# Patient Record
Sex: MALE | Race: WHITE | NOT HISPANIC OR LATINO | Employment: OTHER | URBAN - METROPOLITAN AREA
[De-identification: names, ages, dates, MRNs, and addresses within clinical notes are randomized per-mention and may not be internally consistent; named-entity substitution may affect disease eponyms.]

---

## 2018-08-30 ENCOUNTER — TELEPHONE (OUTPATIENT)
Dept: GASTROENTEROLOGY | Facility: CLINIC | Age: 78
End: 2018-08-30

## 2018-08-30 NOTE — TELEPHONE ENCOUNTER
[facility]       Referred to: [facility]     Provider Requested: N/A     Referral Received:  8/30/2018     Records received: 8/30/2018: No Care Everywhere access     Images received: 8/30/2018: film called to grab images.     Evaluation for: chronic pancreatitis/lesion     Clinical History (per RN review):     MD review date:   MD Decision for clinic consultation/Orders:            Referral updates/Patient contacted:

## 2018-08-30 NOTE — TELEPHONE ENCOUNTER
[facility] Call Center    Phone Message    May a detailed message be left on voicemail: Unknown     Reason for Call: Other: [facility] Intake Form    Referring/Requesting provider and Health care System:    [facility] - Aden Castellanos     [facility] contact - N/A     Requested provider:  N/A     Has patient been evaluated in clinic or had a procedure [facility] provider in the last 5 years: No      Indication/Diagnosis for consultation:  Chronic Pancreatitis / Lesion     Is diagnosis on list of approved diagnosis:     Yes     Has patient been evaluated by another Gastroenterologist?    [facility] GI Provider       CT scan     Yes: "[facility] [facility], 8/6/2018     MRI         No  Upper Endoscopy/EGD  Yes: [facility], 8/14/2018    Endoscopic Ultrasound/EUS       No    ERCP     No    Colonoscopy   No    Are images able/being pushed to our system?    Yes     Is patient aware of request for clinc consultation and ok to be contacted to schedule?      Yes           Action Taken: Message routed to:  [facility]

## 2018-09-04 NOTE — TELEPHONE ENCOUNTER
19 pages of medical records received.   Hard copy folder created and handed over to RNCC.     09/04/2018 @ 849 A

## 2018-09-06 ENCOUNTER — CARE COORDINATION (OUTPATIENT)
Dept: GASTROENTEROLOGY | Facility: CLINIC | Age: 78
End: 2018-09-06

## 2018-09-06 DIAGNOSIS — K86.9 PANCREATIC LESION: Primary | ICD-10-CM

## 2018-09-06 NOTE — PROGRESS NOTES
"[facility] Referral       Referring provider:  Dr. Aden Valente     Clinic Contact: Not provided.     Referred to: [facility]     Referral Received: 8/30/2018    Records received: Care everywhere access today.     Images received: yes    Routed for MD review date: 09/06/18      Evaluation for: Pancreas lesion vs chronic pancreatitis.       Clinical History (per RN review of records provided):     - Patient was evaluated for abdominal pain x5 days. Decreased appetite. Reports his stools are \"never really normal.\"     - abdominal pain is worse with eating.     - Patient underwent CT scan and endoscopy for further evaluation.     - Full endoscopy report and CT report available in care everywhere.     - Per reports - recommend possible evaluation with pancreas specialist.       MD Decision for clinic consultation/Orders:     09/07/2018 12:36 PM - Message received regarding orders for clinic appointment.     Orders placed for MRI and routed to scheduling to arrange for MRI and visit.     Will plan for EUS pending results of MRI and visit.     Referral updates/Patient contacted:     09/07/2018 12:37 PM - message sent to scheduling to arrange for visit with Dr. Boyd with MRI prior.             "

## 2018-09-10 ENCOUNTER — CARE COORDINATION (OUTPATIENT)
Dept: GASTROENTEROLOGY | Facility: CLINIC | Age: 78
End: 2018-09-10

## 2018-09-10 ENCOUNTER — TELEPHONE (OUTPATIENT)
Dept: GASTROENTEROLOGY | Facility: CLINIC | Age: 78
End: 2018-09-10

## 2018-09-10 NOTE — TELEPHONE ENCOUNTER
Spoke with patients wife regarding plan for MRI/MRCP followed by appointment. Message already sent to scheduling to reach out to patient/wife to schedule.

## 2018-09-10 NOTE — TELEPHONE ENCOUNTER
Health Call Center    Phone Message    May a detailed message be left on voicemail: yes    Reason for Call: Other: Patient's wife Apryl, called to set up an appointment for a MRCP and consult with Dr. Boyd for patient. She can be reached at [phone] at any time.     Action Taken: Message routed to:  [facility]

## 2018-10-10 ENCOUNTER — RADIANT APPOINTMENT (OUTPATIENT)
Dept: MRI IMAGING | Facility: CLINIC | Age: 78
End: 2018-10-10
Attending: INTERNAL MEDICINE
Payer: COMMERCIAL

## 2018-10-10 ENCOUNTER — OFFICE VISIT (OUTPATIENT)
Dept: GASTROENTEROLOGY | Facility: CLINIC | Age: 78
End: 2018-10-10
Payer: COMMERCIAL

## 2018-10-10 ENCOUNTER — APPOINTMENT (OUTPATIENT)
Dept: LAB | Facility: CLINIC | Age: 78
End: 2018-10-10
Payer: COMMERCIAL

## 2018-10-10 ENCOUNTER — RADIANT APPOINTMENT (OUTPATIENT)
Dept: GENERAL RADIOLOGY | Facility: CLINIC | Age: 78
End: 2018-10-10
Attending: INTERNAL MEDICINE
Payer: COMMERCIAL

## 2018-10-10 VITALS
HEIGHT: 72 IN | TEMPERATURE: 97.4 F | HEART RATE: 69 BPM | BODY MASS INDEX: 29.54 KG/M2 | OXYGEN SATURATION: 96 % | DIASTOLIC BLOOD PRESSURE: 82 MMHG | SYSTOLIC BLOOD PRESSURE: 147 MMHG | WEIGHT: 218.1 LBS

## 2018-10-10 DIAGNOSIS — K86.9 PANCREATIC LESION: ICD-10-CM

## 2018-10-10 DIAGNOSIS — K85.90 RECURRENT ACUTE PANCREATITIS: Primary | ICD-10-CM

## 2018-10-10 LAB — RADIOLOGIST FLAGS: NORMAL

## 2018-10-10 RX ORDER — IPRATROPIUM BROMIDE 21 UG/1
2 SPRAY, METERED NASAL EVERY 12 HOURS
COMMUNITY

## 2018-10-10 RX ORDER — WARFARIN SODIUM 5 MG/1
5 TABLET ORAL DAILY
COMMUNITY
Start: 2018-10-08

## 2018-10-10 RX ORDER — GADOBUTROL 604.72 MG/ML
10 INJECTION INTRAVENOUS ONCE
Status: COMPLETED | OUTPATIENT
Start: 2018-10-10 | End: 2018-10-10

## 2018-10-10 RX ORDER — SOTALOL HYDROCHLORIDE 120 MG/1
120 TABLET ORAL 2 TIMES DAILY
COMMUNITY
Start: 2017-06-28

## 2018-10-10 RX ADMIN — GADOBUTROL 10 ML: 604.72 INJECTION INTRAVENOUS at 13:39

## 2018-10-10 ASSESSMENT — PAIN SCALES - GENERAL: PAINLEVEL: NO PAIN (0)

## 2018-10-10 NOTE — MR AVS SNAPSHOT
After Visit Summary   10/10/2018    Fransico Scales    MRN: [id number]           Patient Information     Date Of Birth          1940        Visit Information        Provider Department      10/10/2018 4:00 PM Dontae Boyd MD [facility] Pancreas and Biliary        Today's Diagnoses     Recurrent acute pancreatitis    -  1      Care Instructions    I've included a brief summary of your discussion and care plan from today's visit below.  Please review this information with your primary care provider.  _______________________________________________________________________    As discussed by Dr. Boyd, we will proceed with the following:     - Plan for Endoscopic ultrasound (EUS). Our  will contact you to confirm the plans for the procedure.      - Labs today.      Return to GI Clinic pending the results.     As we spoke, once we get results of the tests, we will be discussing them and making the necessary changes.   _______________________________________________________________________    It was a pleasure seeing you in clinic today - please be in touch if there are any further questions that arise following today's visit.  During business hours, you may reach my Clinic Coordinator at [phone].  For urgent/emergent questions after business hours, you may reach the on-call GI Fellow by contacting the [facility]  at [phone].    Any benign/non-urgent test results are usually communicated via letter or [employer] message within 1-2 weeks after completion.  Urgent results (those that require a change in the previously-discussed care plan) are usually communicated via a phone call once available from our clinic staff to discuss the results and the next steps in your evaluation.    I recommend signing up for medidametrics access if you have not already done so and are comfortable with using a computer.  This allows for online access to your lab results and also helps you  communicate efficiently with my clinic should any questions arise in your care.      Sincerely,    Sophia Armstrong RN   Care Coordinator, Dr. Boyd  P:[phone]  F:[phone]              Follow-ups after your visit        Who to contact     Please call your clinic at [phone] to:    Ask questions about your health    Make or cancel appointments    Discuss your medicines    Learn about your test results    Speak to your doctor            Additional Information About Your Visit        Nu[employer]ob[employer] Information     Doodle is an electronic gateway that provides easy, online access to your medical records. With Doodle, you can request a clinic appointment, read your test results, renew a prescription or communicate with your care team.     To sign up for Doodle visit the website at [email or web address]   You will be asked to enter the access code listed below, as well as some personal information. Please follow the directions to create your username and password.     Your access code is: [email or web address]  Expires: 12/10/2018  2:03 PM     Your access code will  in 90 days. If you need help or a new code, please contact your [facility] or call [phone] for assistance.        Care EveryWhere ID     This is your Care EveryWhere ID. This could be used by other organizations to access your [facility] medical records  [id number]        Your Vitals Were     Pulse Temperature Height Pulse Oximetry BMI (Body Mass Index)       69 97.4  F (36.3  C) (Oral) 1.829 m (6') 96% 29.58 kg/m2        Blood Pressure from Last 3 Encounters:   10/10/18 147/82   14 110/66   10/03/11 135/70    Weight from Last 3 Encounters:   10/10/18 98.9 kg (218 lb 1.6 oz)   07 96.5 kg (212 lb 12.8 oz)   07 95.7 kg (211 lb)              We Performed the Following     IGG Subclasses 1-4 ([facility])        Primary Care Provider Office Phone # Fax #    Kandi Khan [phone]        [address]  [facility] [address]        Equal Access to Services     ISABELHAYLEE PERFECTO : Hadii aad ku hadjoeyobie Sotrentonali, waaxda luqadaha, qaybta kaalmada linuseveliohalley, kilo riverarasheedatony londono. So [facility] [phone].    ATENCIÓN: Si habla español, tiene a cunningham disposición servicios gratuitos de asistencia lingüística. Llame al [phone].    We comply with applicable federal civil rights laws and [place] laws. We do not discriminate on the basis of race, color, national origin, age, disability, sex, sexual orientation, or gender identity.            Thank you!     Thank you for choosing [facility]  for your care. Our goal is always to provide you with excellent care. Hearing back from our patients is one way we can continue to improve our services. Please take a few minutes to complete the written survey that you may receive in the mail after your visit with us. Thank you!             Your Updated Medication List - Protect others around you: Learn how to safely use, store and throw away your medicines at www.disposemymeds.org.          This list is accurate as of 10/10/18  4:45 PM.  Always use your most recent med list.                   Brand Name Dispense Instructions for use Diagnosis    * allopurinol 100 MG tablet    ZYLOPRIM     1 TABLET DAILY        * allopurinol 100 MG tablet    ZYLOPRIM     Take 100 mg by mouth daily.        carbidopa-levodopa  MG per tablet    SINEMET    0    1 tabs at bedtime for restless legs        cetirizine 10 MG tablet    zyrTEC     Take 10 mg by mouth 2 times daily.        DOCUSATE SODIUM PO      Take  by mouth as needed.        FIBERCON 625 MG tablet   Generic drug:  calcium polycarbophil      1 tablet every morning        GLUCOSAMINE CHONDROITIN Tabs      1 tablet twice daily        imiquimod 5 % cream    ALDARA     apply to red scaly patches of the face 3 x weekly        ipratropium 0.03 % spray    ATROVENT      Spray 2 sprays into both nostrils every 12 hours        MELATONIN PO      Take  by mouth daily. Take one-half tablet        metoprolol tartrate 50 MG tablet    LOPRESSOR     1 TABLET TWICE DAILY        MULTIVITAMIN PO      1 daily        omeprazole 20 MG CR capsule    priLOSEC     1 CAPSULE DAILY        simvastatin 40 MG tablet    ZOCOR     Take 40 mg by mouth At Bedtime. Take one-half tablet        sotalol 120 MG tablet    BETAPACE     Take 120 mg by mouth        * terazosin 5 MG capsule    HYTRIN     1 CAPSULE AT BEDTIME    Rotator cuff syndrome       * terazosin 10 MG capsule    HYTRIN     Take 10 mg by mouth At Bedtime.        traMADol 50 MG tablet    ULTRAM     1 TABLET EVERY 4 TO 6 HOURS AS NEEDED        TYLENOL ARTHRITIS PAIN 650 MG CR tablet   Generic drug:  acetaminophen      Take 650 mg by mouth. Take two tablets in the AM and two tablets in the PM.        VANICREAM SPF 60 EX      None Entered        warfarin 5 MG tablet    COUMADIN     Take 5 mg by mouth daily        ZOLMitriptan 2.5 MG tablet    ZOMIG     1 1/2 tab prn for migraine        * Notice:  This list has 4 medication(s) that are the same as other medications prescribed for you. Read the directions carefully, and ask your doctor or other care provider to review them with you.

## 2018-10-10 NOTE — PATIENT INSTRUCTIONS
I've included a brief summary of your discussion and care plan from today's visit below.  Please review this information with your primary care provider.  _______________________________________________________________________    As discussed by Dr. Boyd, we will proceed with the following:     - Plan for Endoscopic ultrasound (EUS). Our  will contact you to confirm the plans for the procedure.      - Labs today.      Return to GI Clinic pending the results.     As we spoke, once we get results of the tests, we will be discussing them and making the necessary changes.   _______________________________________________________________________    It was a pleasure seeing you in clinic today - please be in touch if there are any further questions that arise following today's visit.  During business hours, you may reach my Clinic Coordinator at [phone].  For urgent/emergent questions after business hours, you may reach the on-call GI Fellow by contacting the [facility]  at [phone].    Any benign/non-urgent test results are usually communicated via letter or [employer] message within 1-2 weeks after completion.  Urgent results (those that require a change in the previously-discussed care plan) are usually communicated via a phone call once available from our clinic staff to discuss the results and the next steps in your evaluation.    I recommend signing up for [employer] access if you have not already done so and are comfortable with using a computer.  This allows for online access to your lab results and also helps you communicate efficiently with my clinic should any questions arise in your care.      Sincerely,    Sophia Armstrong RN   Care Coordinator, Dr. Boyd  P:[phone]  F:[phone]

## 2018-10-10 NOTE — LETTER
10/10/2018       RE: Fransico Scales  [address] [facility] [id number]     Dear Colleague,    Thank you for referring your patient, Fransico Scales, to the [facility] PANCREAS AND BILIARY at [facility]. Please see a copy of my visit note below.    GASTROENTEROLOGY OUTPATIENT CLINIC CONSULT  DATE OF SERVICE: 10/10/2018  PHYSICIAN REQUESTING CONSULT: Ambrosio Danielle  Reason for Consultation: abnormal CT; idiopathic recurrent acute pancreatitis    ASSESSMENT:  78 year old male with a PMHx of idiopathic acute pancreatitis x 2 in 2007, s/p cholecystectomy, h/o Schatzki's ring s/p prior dilation and atrial fibrillation on warfarin who was referred after an episodes of epigastric pain and CT showing possible focal pancreatitis in the head. His symptom presentation and CT imaging in early August are both compatible with very mild acute interstitial pancreatitis and meets two out of the 3 criteria which is sufficient. There was a 5 mm cystic lesion in the body of the pancreas on the CT from August but this could represent an acute fluid collection from acute pancreatitis or a cystic neoplasm. We obtained an MRI/MRCP today as part of his work up for recurrent acute pancreatitis and I reviewed the images with the patient and his wife. Multiple cysts are seen throughout the pancreas with the largest cysts measuring 9 mm and 10 mm in the tail and head arising from the PD but without any worrisome features and normal PD size. The bile duct was also normal. 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 Other work up for acute pancreatitis has been negative (no alcohol, s/p ismael, normal Ca/TG, no culprit medications, no family history of pancreatitis). We will proceed with an EUS to rule out underlying obstructing neoplasm although I explained that is  unlikely as well as rule out small CBD stones that MRCP is sensitive enough to detect. Will also check IgG4 level to rule out AIP, but again unlikely. If work up negative, would recommend expectant management. Unclear what his risk would be at that point for a recurrent episode of acute pancreatitis.     RECOMMENDATIONS:  - Schedule for EUS  - Will need to hold warfarin prior to EUS  - IgG4 level      Thank you for this consultation.  It was a pleasure to participate in the care of this patient; please contact us with any further questions.  A total of 40 minutes was spent in face to face evaluation with this patient, >50% of which was counseling and coordinating a management plan for this patient.     Dontae Boyd MD  [facility]  Division of Gastroenterology and Hepatology  [facility] [address]  [address] [address]    __________________________________________________________________________________  HPI:  78 year old male with a PMHx of idiopathic acute pancreatitis x 2, s/p cholecystectomy, h/o Schatzki's ring s/p prior dilation and atrial fibrillation on warfarin who was referred after an episodes of epigastric pain and CT showing possible focal pancreatitis in the head. The patient reported being in his usual state of health without pain when he developed sudden onset epigastric pain radiating to his back that was 4-6/10 in early August 2018. He had anorexia but no nausea or vomiting.  The pain was worse with eating. The pain lasted about 4-5 days and spontaneously resolved. He was evaluated at the time of his symptoms with normal labs including lipase of 77 and LFTs, except for elevated ESR and CRP (28 and 2.72 respectively). He had a CT on 8/6/18 that showed mild fat stranding at the junction of the pancreatic head and duodenum along with a 5 mm cystic lesion at the head/body junction. The CT was otherwise normal. He had an EGD on 8/14 at [facility] that also  included gastric and duodenal biopsies that were essentially normal. Currently, the patient feels well without pain or other symptoms. Patient denies any alcohol or tobacco use. He denies any new medications. No trauma history or recent travel. Calcium and triglycerides were normal.     In regards to his prior history of acute pancreatitis, he believes his first episode happened in 2006 and the pain was much more severe then the episode he had this past August. [facility] and [facility] that did not find a cause for his pancreatitis. He thinks he had another episode of acute pancreatitis for which he was hospitalized for in ~2007 but he and his wife were not sure and we do not have the records to review. They report that after that episode he had his gallbladder removed and he might have had a procedure that sounds like and ERCP but it is not clear.     Patient denies jaundice, abdominal distension, lower extremity edema, lethargy or confusion.    No history of melena, hematemesis or hematochezia.    Patient denies fevers, sweats, chills or weight loss.    PMHx:  Past Medical History:   Diagnosis Date     Acute pancreatitis 2007 January and February     Essential hypertension, benign      GERD (gastroesophageal reflux disease)      Gout      Gout, unspecified      Hypertension      Localized osteoarthrosis not specified whether primary or secondary, lower leg 5/24/07    Hospitalized     Other and unspecified malignant neoplasm of skin of other and unspecified parts of face      Other forms of migraine, with intractable migraine, so stated, without mention of status migrainosus      Restless leg syndrome      Restless legs syndrome (RLS)      Squamous cell carcinoma      PSurgHx:  Cholecystectomy ~2012  Left Knee replacement  Lithotripsy for bladder stones  Bilateral cataract surgery    MEDS:  Current Outpatient Prescriptions   Medication     acetaminophen (TYLENOL  ARTHRITIS PAIN) 650 MG CR tablet     allopurinol (ZYLOPRIM) 100 MG tablet     ALLOPURINOL 100 MG OR TABS     CARBIDOPA-LEVODOPA  MG OR TABS     cetirizine (ZYRTEC) 10 MG tablet     DOCUSATE SODIUM PO     FIBERCON 625 MG OR TABS     GLUCOSAMINE CHONDROITIN OR TABS     IMIQUIMOD 5 % EX CREA     ipratropium (ATROVENT) 0.03 % spray     METOPROLOL TARTRATE 50 MG OR TABS     MULTIVITAMIN OR     Nutritional Supplements (MELATONIN PO)     OMEPRAZOLE 20 MG OR CPDR     simvastatin (ZOCOR) 40 MG tablet     sotalol (BETAPACE) 120 MG tablet     TERAZOSIN HCL 5 MG OR CAPS     TRAMADOL HCL 50 MG OR TABS     VANICREAM SPF 60 EX     warfarin (COUMADIN) 5 MG tablet     terazosin (HYTRIN) 10 MG capsule     ZOLMITRIPTAN 2.5 MG OR TABS     No current facility-administered medications for this visit.      ALLERGIES:    Allergies   Allergen Reactions     Doxycycline Rash     Burning sensation on skin     FHx:  Family History   Problem Relation Age of Onset     Cancer Mother      pancreatic     HEART DISEASE Father       age 55, MI     Diabetes Sister      1/2 sister       SOCIAL Hx:  Social History     Social History     Marital status:      Spouse name: N/A     Number of children: 1     Years of education: 12     Occupational History     retired      /means     Social History Main Topics     Smoking status: Former Smoker     Quit date: 1994     Smokeless tobacco: Never Used     Alcohol use No      Comment: very little     Drug use: Not on file     Sexual activity: Not on file     Other Topics Concern      Service Yes     Blood Transfusions No     Caffeine Concern Yes     alot of COKE     Sleep Concern No     Stress Concern No     Exercise No     knee problems     Seat Belt Yes     Social History Narrative       ROS: A comprehensive Review of Systems was asked and answered in the negative unless specifically commented upon in the HPI    Physical Exam  /82  Pulse 69  Temp 97.4  F (36.3  C)  (Oral)  Ht 6'  Wt 218 lb 1.6 oz  SpO2 96%  BMI 29.58 kg/m2  Body mass index is 29.58 kg/(m^2).  Gen: A&Ox3, NAD  HEENT: Moist mucus membranes, no scleral icterus.  Lungs: no respiratory distress  Abd: soft, non-tender, non-distended.  No guarding/rigidity/rebound.  Skin: no jaundice, no stigmata of chronic liver disease  Ext: warm, dry, no evidence of edema    LABS:  Results for orders placed or performed in visit on 08/06/18   LIPASE   Result Value Ref Range Status   LIPASE 77.5 8.0 - 78.0 IU/L Final   C-REACTIVE PROTEIN   Result Value Ref Range Status   C-REACTIVE PROTEIN 2.72 (H) <0.50 mg/dL Final   SEDIMENTATION RATE   Result Value Ref Range Status   SEDIMENTATION RATE 28 (H) <21 mm/hr Final   COMP METABOLIC PANEL   Result Value Ref Range Status   SODIUM 140 135 - 145 mmol/L Final   POTASSIUM 4.3 3.5 - 5.0 mmol/L Final   CHLORIDE 104 98 - 110 mmol/L Final   CO2,TOTAL 26 21 - 31 mmol/L Final   ANION GAP 10 5 - 18 Final   GLUCOSE 119 (H) 65 - 100 mg/dL Final   CALCIUM 10.0 8.5 - 10.5 mg/dL Final   BUN 17 8 - 25 mg/dL Final   CREATININE 1.18 0.72 - 1.25 mg/dL Final   BUN/CREAT RATIO 14 10 - 20 Final   GFR if African American >60 >60 ml/min/1.73m2 Final   GFR if not African American 60 (L) >60 ml/min/1.73m2 Final   ALBUMIN 4.3 3.2 - 4.6 g/dL Final   PROTEIN,TOTAL 7.5 6.0 - 8.0 g/dL Final   GLOBULIN 3.2 2.0 - 3.7 g/dL Final   A/G RATIO 1.3 1.0 - 2.0 Final   BILIRUBIN,TOTAL 0.7 0.2 - 1.2 mg/dL Final   ALK PHOSPHATASE 95 50 - 136 IU/L Final   ALT (SGPT) 11 8 - 45 IU/L Final   AST (SGOT) 16 2 - 40 IU/L Final   CBC WITH AUTO DIFFERENTIAL   Result Value Ref Range Status   WHITE BLOOD COUNT 8.8 4.5 - 11.0 thou/cu mm Final   RED BLOOD COUNT 4.24 (L) 4.30 - 5.90 mil/cu mm Final   HEMOGLOBIN 12.9 (L) 13.5 - 17.5 g/dL Final   HEMATOCRIT 39.5 37.0 - 53.0 % Final   MCV 93 80 - 100 fL Final   MCH 30.4 26.0 - 34.0 pg Final   MCHC 32.7 32.0 - 36.0 g/dL Final   RDW 14.3 11.5 - 15.5 % Final   PLATELET COUNT 172 140 - 440 thou/cu  mm Final   MPV 9.8 6.5 - 11.0 fL Final   NEUTROPHILS 80.0 % Final   LYMPHOCYTES 8.8 % Final   MONOCYTES 7.2 % Final   EOSINOPHILS 3.7 % Final   BASOPHILS 0.3 % Final   ABSOLUTE NEUTROPHILS 7.0 1.7 - 7.0 thou/cu mm Final   ABSOLUTE LYMPHOCYTES 0.8 (L) 0.9 - 2.9 thou/cu mm Final   ABSOLUTE MONOCYTES 0.6 <0.9 thou/cu mm Final   ABSOLUTE EOSINOPHILS 0.3 <0.5 thou/cu mm Final   ABSOLUTE BASOPHILS 0.0 <0.3 thou/cu mm Final       IMAGIN2018 CT abd/pelvis with contrast  INDICATION:  Abdominal pain and nausea. History of diastasis recti. Concern for hernia.    TECHNIQUE:  CT abdomen and pelvis acquired with 100 cc Omnipaque 350 IV and oral contrast.    COMPARISON:  May 4, 2017.    FINDINGS:  Lower chest: Unremarkable.    Liver: Few subcentimeter low attenuation lesions are too small to characterize but likely represent benign cysts.     Gallbladder and bile ducts: Status post cholecystectomy. No biliary dilatation.     Pancreas: A 5 mm low-attenuation lesion is at the junction of the pancreatic head and body visualized on series 2, image 53. There is mild fat stranding about the junction of the pancreatic head and duodenum.     Spleen: Normal in caliber. No masses.     Adrenal glands: Unremarkable. No masses.     Kidneys: Normal in caliber. No masses.     GI tract: Normal in caliber and appearance. No sign of mass or inflammation. Normal appendix.    Vasculature: Moderate atherosclerosis without aneurysm.     Lymph nodes: No lymphadenopathy.     Abdominal wall/Omentum/Peritoneum: There is no sign of hernia or diastasis rectus. No mass or soft tissue infiltration. No free air or fluid collection.     Pelvic organs: There is moderate prostate gland hypertrophy. Otherwise unremarkable pelvis.     Bones: There are pars defects at L5 and a grade 1/2 spondylolisthesis and degenerative disc spondylosis at L5-S1. No other significant finding.     IMPRESSION:  1. No sign of hernia or diastasis recti.   2. Bowel and appendix  are normal.   3. Minimal fat stranding is above the junction of the pancreatic head and duodenum. This could be chronic or acute. Duodenitis or pancreatitis or possible but not definite.   4. Indeterminate 5 mm low-attenuation lesion in the proximal pancreas.    EXAMINATION: MR ABDOMEN MRCP W/O & W CONTRAST, 10/10/2018 2:54  PM     CLINICAL HISTORY: Further evaluate pancreas lesion.; Pancreatic  lesion. Two bouts of acute pancreatitis, reportedly in 2007.     DATE: 10/10/2018 2:54 PM     TECHNIQUE:      Images were acquired with and without intravenous gadolinium contrast  through the upper abdomen. The following MR images were acquired  without intravenous contrast: coronal TrueFISP, multiplanar  T2-weighted, axial T1 in/out of phase, coronal T2 HASTE MRCP images,  axial diffusion-weighted and axial apparent diffusion coefficient. The  following MR T1-weighted images were acquired with respect to  intravenous contrast administration: precontrast, immediately post  contrast, 1 minute and 5 minutes with subtraction.      Contrast: 10mL Gadavist      Comparison study: CT abdomen/pelvis 8/6/2018     FINDINGS:     MRCP: The common bile duct is normal, measuring 7 mm in diameter. The  duct appears to taper normally at the ampulla there are no  intraluminal filling defects.     Pancreas:   There are innumerable cystic lesions throughout the pancreas. Several  tiny lesions measuring 3-5 mm are seen in the head of the pancreas.  There are 2, larger tandem cysts in the tail of the pancreas, both of  which appear to arise from the main pancreatic duct, each of which  measures approximately 9 mm.  Superiorly within the body of the  pancreas is another prominent cyst measuring 10 mm, as well as another  one inferiorly in the same region measuring 9 mm. No associated soft  tissue nodularity or enhancement. The main pancreatic duct is not  dilated. No pancreas disease in.     Otherwise, normal acinar contour of the pancreas is  preserved and the  pancreatic duct is nondilated.  The pancreas enhances homogeneously.     Liver: Noncirrhotic hepatic configuration. No evidence of fatty  infiltration; no steatosis. No evidence of a focal hepatic lesion.  Scattered T2 hyperintense nonenhancing cysts.     Gallbladder: Surgically absent.     Spleen: Normal. Splenule, including a small a small splenule adjacent  to the pancreatic tail.     Kidneys: There are a few tiny cysts in each kidney.     Adrenal glands: Normal.     Bowel: Unremarkable.     Lymph nodes: No evidence of pathologic lymphadenopathy.     Blood vessels: Diffuse atherosclerotic disease of the abdominal aorta,  otherwise the major abdominal vessels appear patent.     Lung bases: Clear.     Bones and soft tissues: Unremarkable.     Mesentery and abdominal wall: Unremarkable. The abdominal wall is  intact.     Ascites: None.         IMPRESSION:   1.  Innumerable cystic lesions throughout the pancreas including  dominant lesions at the head and tail measuring between 9 and 10 mm.  Given history, these could represent sequela of prior pancreatitis;  however, side branch type IPMN is also in the differential diagnosis.  No worrisome features. Follow-up criteria below.  2.  Pancreas is otherwise mildly atrophic, but with relatively normal  signal and enhancement. No solid enhancing lesion. No acute  peripancreatic inflammatory changes.      Guicho Valencia MD - 08/14/2018  4:00 PM CDT  _______________________________________________________________________________  Patient Name: Fransico Scales         Procedure Date: 8/14/2018  MRN: [id number]                       Gender: Male  Account Number: [id number]             YOB: 1940  Admit Type: Outpatient                  _______________________________________________________________________________    Procedure:                    Upper GI endoscopy  Proceduralist:                Guicho Valencia MD , Lyric Saab (Nurse),                                  Lucía Garcia (Nurse)  Indications/Pre-Op Diagnosis: Upper abdominal symptoms that persist despite                                 an appropriate trial of therapy, Endoscopy to                                 confirm 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       The GIF-Q180 [id number] was introduced through the mouth, and advanced to        the third part of duodenum. The upper GI endoscopy was accomplished        without difficulty. The patient tolerated the procedure well.    Complications:                No immediate complications.  Estimated Blood Loss & Specimen:       Estimated blood loss: none. Specimen collected - Yes and sent to        Laboratory    Findings:       The Z-line was regular and was found 40 cm from the incisors.       A 1 cm hiatal hernia was present.       The entire examined stomach was normal. Biopsies were taken with a cold        forceps for histology.       Patchy mildly erythematous mucosa without active bleeding and with no        stigmata of bleeding was found in the duodenal bulb. Biopsies were taken        with a cold forceps for histology.       The second portion of the duodenum and third portion of the duodenum        were normal. Biopsies were taken with a cold forceps for histology.    Impressions/Post-Op Diagnosis:       - Z-line regular, 40 cm from the incisors.       - 1 cm hiatal hernia.       - Normal stomach. Biopsied.       - Erythematous duodenopathy. Biopsied.       - Normal second portion of the duodenum and third portion of the        duodenum. Biopsied.    Recommendation:       - Patient  has a contact number available for emergencies. The signs and        symptoms of potential delayed complications were discussed with the        patient. Return to normal activities tomorrow. Written discharge        instructions were provided to the patient.       - Resume previous diet.       - Continue present medications.       - Resume Coumadin (warfarin) at prior dose today. Refer to Coumadin        Clinic for further adjustment of therapy.       - Await pathology results.       - Consider referral to pancreas expert to decide if futher evaluation of        pancreas lesion is needed at this time.      Again, thank you for allowing me to participate in the care of your patient.      Sincerely,    Dontae Boyd MD

## 2018-10-10 NOTE — NURSING NOTE
No chief complaint on file.      Vitals:    10/10/18 1548   BP: 152/87   Pulse: 69   Temp: 97.4  F (36.3  C)   TempSrc: Oral   SpO2: 96%   Weight: 218 lb 1.6 oz   Height: 6'       Body mass index is 29.58 kg/(m^2).      Will Santos on 10/10/2018 at 3:59 PM

## 2018-10-10 NOTE — PROGRESS NOTES
GASTROENTEROLOGY OUTPATIENT CLINIC CONSULT  DATE OF SERVICE: 10/10/2018  PHYSICIAN REQUESTING CONSULT: Ambrosio Danielle  Reason for Consultation: abnormal CT; idiopathic recurrent acute pancreatitis    ASSESSMENT:  78 year old male with a PMHx of idiopathic acute pancreatitis x 2 in 2007, s/p cholecystectomy, h/o Schatzki's ring s/p prior dilation and atrial fibrillation on warfarin who was referred after an episodes of epigastric pain and CT showing possible focal pancreatitis in the head. His symptom presentation and CT imaging in early August are both compatible with very mild acute interstitial pancreatitis and meets two out of the 3 criteria which is sufficient. There was a 5 mm cystic lesion in the body of the pancreas on the CT from August but this could represent an acute fluid collection from acute pancreatitis or a cystic neoplasm. We obtained an MRI/MRCP today as part of his work up for recurrent acute pancreatitis and I reviewed the images with the patient and his wife. Multiple cysts are seen throughout the pancreas with the largest cysts measuring 9 mm and 10 mm in the tail and head arising from the PD but without any worrisome features and normal PD size. The bile duct was also normal. 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 Other work up for acute pancreatitis has been negative (no alcohol, s/p ismael, normal Ca/TG, no culprit medications, no family history of pancreatitis). We will proceed with an EUS to rule out underlying obstructing neoplasm although I explained that is unlikely as well as rule out small CBD stones that MRCP is sensitive enough to detect. Will also check IgG4 level to rule out AIP, but again unlikely. If work up negative, would recommend expectant management. Unclear what his risk would be at that point for a recurrent episode of acute pancreatitis.      RECOMMENDATIONS:  - Schedule for EUS  - Will need to hold warfarin prior to EUS  - IgG4 level      Thank you for this consultation.  It was a pleasure to participate in the care of this patient; please contact us with any further questions.  A total of 40 minutes was spent in face to face evaluation with this patient, >50% of which was counseling and coordinating a management plan for this patient.     Dontae Boyd MD  [facility]  Division of Gastroenterology and Hepatology  [facility] [address] [address]    __________________________________________________________________________________  HPI:  78 year old male with a PMHx of idiopathic acute pancreatitis x 2, s/p cholecystectomy, h/o Schatzki's ring s/p prior dilation and atrial fibrillation on warfarin who was referred after an episodes of epigastric pain and CT showing possible focal pancreatitis in the head. The patient reported being in his usual state of health without pain when he developed sudden onset epigastric pain radiating to his back that was 4-6/10 in early August 2018. He had anorexia but no nausea or vomiting.  The pain was worse with eating. The pain lasted about 4-5 days and spontaneously resolved. He was evaluated at the time of his symptoms with normal labs including lipase of 77 and LFTs, except for elevated ESR and CRP (28 and 2.72 respectively). He had a CT on 8/6/18 that showed mild fat stranding at the junction of the pancreatic head and duodenum along with a 5 mm cystic lesion at the head/body junction. The CT was otherwise normal. He had an EGD on 8/14 at [facility] that also included gastric and duodenal biopsies that were essentially normal. Currently, the patient feels well without pain or other symptoms. Patient denies any alcohol or tobacco use. He denies any new medications. No trauma history or recent travel. Calcium and triglycerides were normal.     In regards to his  prior history of acute pancreatitis, he believes his first episode happened in 2006 and the pain was much more severe then the episode he had this past August. [facility] that did not find a cause for his pancreatitis. He thinks he had another episode of acute pancreatitis for which he was hospitalized for in ~2007 but he and his wife were not sure and we do not have the records to review. They report that after that episode he had his gallbladder removed and he might have had a procedure that sounds like and ERCP but it is not clear.     Patient denies jaundice, abdominal distension, lower extremity edema, lethargy or confusion.    No history of melena, hematemesis or hematochezia.    Patient denies fevers, sweats, chills or weight loss.    PMHx:  Past Medical History:   Diagnosis Date     Acute pancreatitis 2007 January and February     Essential hypertension, benign      GERD (gastroesophageal reflux disease)      Gout      Gout, unspecified      Hypertension      Localized osteoarthrosis not specified whether primary or secondary, lower leg 5/24/07    Hospitalized     Other and unspecified malignant neoplasm of skin of other and unspecified parts of face      Other forms of migraine, with intractable migraine, so stated, without mention of status migrainosus      Restless leg syndrome      Restless legs syndrome (RLS)      Squamous cell carcinoma      PSurgHx:  Cholecystectomy ~2012  Left Knee replacement  Lithotripsy for bladder stones  Bilateral cataract surgery    MEDS:  Current Outpatient Prescriptions   Medication     acetaminophen (TYLENOL ARTHRITIS PAIN) 650 MG CR tablet     allopurinol (ZYLOPRIM) 100 MG tablet     ALLOPURINOL 100 MG OR TABS     CARBIDOPA-LEVODOPA  MG OR TABS     cetirizine (ZYRTEC) 10 MG tablet     DOCUSATE SODIUM PO     FIBERCON 625 MG OR TABS     GLUCOSAMINE CHONDROITIN OR TABS     IMIQUIMOD 5 % EX CREA     ipratropium  (ATROVENT) 0.03 % spray     METOPROLOL TARTRATE 50 MG OR TABS     MULTIVITAMIN OR     Nutritional Supplements (MELATONIN PO)     OMEPRAZOLE 20 MG OR CPDR     simvastatin (ZOCOR) 40 MG tablet     sotalol (BETAPACE) 120 MG tablet     TERAZOSIN HCL 5 MG OR CAPS     TRAMADOL HCL 50 MG OR TABS     VANICREAM SPF 60 EX     warfarin (COUMADIN) 5 MG tablet     terazosin (HYTRIN) 10 MG capsule     ZOLMITRIPTAN 2.5 MG OR TABS     No current facility-administered medications for this visit.      ALLERGIES:    Allergies   Allergen Reactions     Doxycycline Rash     Burning sensation on skin     FHx:  Family History   Problem Relation Age of Onset     Cancer Mother      pancreatic     HEART DISEASE Father       age 55, MI     Diabetes Sister      1/2 sister       SOCIAL Hx:  Social History     Social History     Marital status:      Spouse name: N/A     Number of children: 1     Years of education: 12     Occupational History     retired      /means     Social History Main Topics     Smoking status: Former Smoker     Quit date: 1994     Smokeless tobacco: Never Used     Alcohol use No      Comment: very little     Drug use: Not on file     Sexual activity: Not on file     Other Topics Concern      Service Yes     Blood Transfusions No     Caffeine Concern Yes     alot of COKE     Sleep Concern No     Stress Concern No     Exercise No     knee problems     Seat Belt Yes     Social History Narrative       ROS: A comprehensive Review of Systems was asked and answered in the negative unless specifically commented upon in the HPI    Physical Exam  /82  Pulse 69  Temp 97.4  F (36.3  C) (Oral)  Ht 6'  Wt 218 lb 1.6 oz  SpO2 96%  BMI 29.58 kg/m2  Body mass index is 29.58 kg/(m^2).  Gen: A&Ox3, NAD  HEENT: Moist mucus membranes, no scleral icterus.  Lungs: no respiratory distress  Abd: soft, non-tender, non-distended.  No guarding/rigidity/rebound.  Skin: no jaundice, no stigmata of chronic  liver disease  Ext: warm, dry, no evidence of edema    LABS:  Results for orders placed or performed in visit on 08/06/18   LIPASE   Result Value Ref Range Status   LIPASE 77.5 8.0 - 78.0 IU/L Final   C-REACTIVE PROTEIN   Result Value Ref Range Status   C-REACTIVE PROTEIN 2.72 (H) <0.50 mg/dL Final   SEDIMENTATION RATE   Result Value Ref Range Status   SEDIMENTATION RATE 28 (H) <21 mm/hr Final   COMP METABOLIC PANEL   Result Value Ref Range Status   SODIUM 140 135 - 145 mmol/L Final   POTASSIUM 4.3 3.5 - 5.0 mmol/L Final   CHLORIDE 104 98 - 110 mmol/L Final   CO2,TOTAL 26 21 - 31 mmol/L Final   ANION GAP 10 5 - 18 Final   GLUCOSE 119 (H) 65 - 100 mg/dL Final   CALCIUM 10.0 8.5 - 10.5 mg/dL Final   BUN 17 8 - 25 mg/dL Final   CREATININE 1.18 0.72 - 1.25 mg/dL Final   BUN/CREAT RATIO 14 10 - 20 Final   GFR if African American >60 >60 ml/min/1.73m2 Final   GFR if not African American 60 (L) >60 ml/min/1.73m2 Final   ALBUMIN 4.3 3.2 - 4.6 g/dL Final   PROTEIN,TOTAL 7.5 6.0 - 8.0 g/dL Final   GLOBULIN 3.2 2.0 - 3.7 g/dL Final   A/G RATIO 1.3 1.0 - 2.0 Final   BILIRUBIN,TOTAL 0.7 0.2 - 1.2 mg/dL Final   ALK PHOSPHATASE 95 50 - 136 IU/L Final   ALT (SGPT) 11 8 - 45 IU/L Final   AST (SGOT) 16 2 - 40 IU/L Final   CBC WITH AUTO DIFFERENTIAL   Result Value Ref Range Status   WHITE BLOOD COUNT 8.8 4.5 - 11.0 thou/cu mm Final   RED BLOOD COUNT 4.24 (L) 4.30 - 5.90 mil/cu mm Final   HEMOGLOBIN 12.9 (L) 13.5 - 17.5 g/dL Final   HEMATOCRIT 39.5 37.0 - 53.0 % Final   MCV 93 80 - 100 fL Final   MCH 30.4 26.0 - 34.0 pg Final   MCHC 32.7 32.0 - 36.0 g/dL Final   RDW 14.3 11.5 - 15.5 % Final   PLATELET COUNT 172 140 - 440 thou/cu mm Final   MPV 9.8 6.5 - 11.0 fL Final   NEUTROPHILS 80.0 % Final   LYMPHOCYTES 8.8 % Final   MONOCYTES 7.2 % Final   EOSINOPHILS 3.7 % Final   BASOPHILS 0.3 % Final   ABSOLUTE NEUTROPHILS 7.0 1.7 - 7.0 thou/cu mm Final   ABSOLUTE LYMPHOCYTES 0.8 (L) 0.9 - 2.9 thou/cu mm Final   ABSOLUTE MONOCYTES 0.6 <0.9  thou/cu mm Final   ABSOLUTE EOSINOPHILS 0.3 <0.5 thou/cu mm Final   ABSOLUTE BASOPHILS 0.0 <0.3 thou/cu mm Final       IMAGIN2018 CT abd/pelvis with contrast  INDICATION:  Abdominal pain and nausea. History of diastasis recti. Concern for hernia.    TECHNIQUE:  CT abdomen and pelvis acquired with 100 cc Omnipaque 350 IV and oral contrast.    COMPARISON:  May 4, 2017.    FINDINGS:  Lower chest: Unremarkable.    Liver: Few subcentimeter low attenuation lesions are too small to characterize but likely represent benign cysts.     Gallbladder and bile ducts: Status post cholecystectomy. No biliary dilatation.     Pancreas: A 5 mm low-attenuation lesion is at the junction of the pancreatic head and body visualized on series 2, image 53. There is mild fat stranding about the junction of the pancreatic head and duodenum.     Spleen: Normal in caliber. No masses.     Adrenal glands: Unremarkable. No masses.     Kidneys: Normal in caliber. No masses.     GI tract: Normal in caliber and appearance. No sign of mass or inflammation. Normal appendix.    Vasculature: Moderate atherosclerosis without aneurysm.     Lymph nodes: No lymphadenopathy.     Abdominal wall/Omentum/Peritoneum: There is no sign of hernia or diastasis rectus. No mass or soft tissue infiltration. No free air or fluid collection.     Pelvic organs: There is moderate prostate gland hypertrophy. Otherwise unremarkable pelvis.     Bones: There are pars defects at L5 and a grade 1/2 spondylolisthesis and degenerative disc spondylosis at L5-S1. No other significant finding.     IMPRESSION:  1. No sign of hernia or diastasis recti.   2. Bowel and appendix are normal.   3. Minimal fat stranding is above the junction of the pancreatic head and duodenum. This could be chronic or acute. Duodenitis or pancreatitis or possible but not definite.   4. Indeterminate 5 mm low-attenuation lesion in the proximal pancreas.    EXAMINATION: MR ABDOMEN MRCP W/O & W CONTRAST,  10/10/2018 2:54  PM     CLINICAL HISTORY: Further evaluate pancreas lesion.; Pancreatic  lesion. Two bouts of acute pancreatitis, reportedly in 2007.     DATE: 10/10/2018 2:54 PM     TECHNIQUE:      Images were acquired with and without intravenous gadolinium contrast  through the upper abdomen. The following MR images were acquired  without intravenous contrast: coronal TrueFISP, multiplanar  T2-weighted, axial T1 in/out of phase, coronal T2 HASTE MRCP images,  axial diffusion-weighted and axial apparent diffusion coefficient. The  following MR T1-weighted images were acquired with respect to  intravenous contrast administration: precontrast, immediately post  contrast, 1 minute and 5 minutes with subtraction.      Contrast: 10mL Gadavist      Comparison study: CT abdomen/pelvis 8/6/2018     FINDINGS:     MRCP: The common bile duct is normal, measuring 7 mm in diameter. The  duct appears to taper normally at the ampulla there are no  intraluminal filling defects.     Pancreas:   There are innumerable cystic lesions throughout the pancreas. Several  tiny lesions measuring 3-5 mm are seen in the head of the pancreas.  There are 2, larger tandem cysts in the tail of the pancreas, both of  which appear to arise from the main pancreatic duct, each of which  measures approximately 9 mm.  Superiorly within the body of the  pancreas is another prominent cyst measuring 10 mm, as well as another  one inferiorly in the same region measuring 9 mm. No associated soft  tissue nodularity or enhancement. The main pancreatic duct is not  dilated. No pancreas disease in.     Otherwise, normal acinar contour of the pancreas is preserved and the  pancreatic duct is nondilated.  The pancreas enhances homogeneously.     Liver: Noncirrhotic hepatic configuration. No evidence of fatty  infiltration; no steatosis. No evidence of a focal hepatic lesion.  Scattered T2 hyperintense nonenhancing cysts.     Gallbladder: Surgically  absent.     Spleen: Normal. Splenule, including a small a small splenule adjacent  to the pancreatic tail.     Kidneys: There are a few tiny cysts in each kidney.     Adrenal glands: Normal.     Bowel: Unremarkable.     Lymph nodes: No evidence of pathologic lymphadenopathy.     Blood vessels: Diffuse atherosclerotic disease of the abdominal aorta,  otherwise the major abdominal vessels appear patent.     Lung bases: Clear.     Bones and soft tissues: Unremarkable.     Mesentery and abdominal wall: Unremarkable. The abdominal wall is  intact.     Ascites: None.         IMPRESSION:   1.  Innumerable cystic lesions throughout the pancreas including  dominant lesions at the head and tail measuring between 9 and 10 mm.  Given history, these could represent sequela of prior pancreatitis;  however, side branch type IPMN is also in the differential diagnosis.  No worrisome features. Follow-up criteria below.  2.  Pancreas is otherwise mildly atrophic, but with relatively normal  signal and enhancement. No solid enhancing lesion. No acute  peripancreatic inflammatory changes.      Guicho Valencia MD - 08/14/2018  4:00 PM CDT  _______________________________________________________________________________  Patient Name: Fransico Scales         Procedure Date: 8/14/2018  MRN: [id number]                       Gender: Male  Account Number: [id number]             YOB: 1940  Admit Type: Outpatient                  _______________________________________________________________________________    Procedure:                    Upper GI endoscopy  Proceduralist:                Guicho Valencia MD , Lyric Saab (Nurse),                                 Lucía Garcia (Nurse)  Indications/Pre-Op Diagnosis: Upper abdominal symptoms that persist despite                                 an appropriate trial of therapy, Endoscopy to                                 confirm 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       The GIF-Q180 [id number] was introduced through the mouth, and advanced to        the third part of duodenum. The upper GI endoscopy was accomplished        without difficulty. The patient tolerated the procedure well.    Complications:                No immediate complications.  Estimated Blood Loss & Specimen:       Estimated blood loss: none. Specimen collected - Yes and sent to        Laboratory    Findings:       The Z-line was regular and was found 40 cm from the incisors.       A 1 cm hiatal hernia was present.       The entire examined stomach was normal. Biopsies were taken with a cold        forceps for histology.       Patchy mildly erythematous mucosa without active bleeding and with no        stigmata of bleeding was found in the duodenal bulb. Biopsies were taken        with a cold forceps for histology.       The second portion of the duodenum and third portion of the duodenum        were normal. Biopsies were taken with a cold forceps for histology.    Impressions/Post-Op Diagnosis:       - Z-line regular, 40 cm from the incisors.       - 1 cm hiatal hernia.       - Normal stomach. Biopsied.       - Erythematous duodenopathy. Biopsied.       - Normal second portion of the duodenum and third portion of the        duodenum. Biopsied.    Recommendation:       - Patient has a contact number available for emergencies. The signs and        symptoms of potential delayed complications were discussed with the        patient. Return to normal activities tomorrow. Written discharge        instructions were provided to the patient.       - Resume previous diet.       -  Continue present medications.       - Resume Coumadin (warfarin) at prior dose today. Refer to Coumadin        Clinic for further adjustment of therapy.       - Await pathology results.       - Consider referral to pancreas expert to decide if futher evaluation of        pancreas lesion is needed at this time.

## 2018-10-10 NOTE — DISCHARGE INSTRUCTIONS
MRI Contrast Discharge Instructions    The IV contrast you received today will pass out of your body in your  urine. This will happen in the next 24 hours. You will not feel this process.  Your urine will not change color.    Drink at least 4 extra glasses of water or juice today (unless your doctor  has restricted your fluids). This reduces the stress on your kidneys.  You may take your regular medicines.    If you are on dialysis: It is best to have dialysis today.    If you have a reaction: Most reactions happen right away. If you have  any new symptoms after leaving the hospital (such as hives or swelling),  call your hospital at the correct number below. Or call your family doctor.  If you have breathing distress or wheezing, call 911.    Special instructions: ***    I have read and understand the above information.    Signature:______________________________________ Date:___________    Staff:__________________________________________ Date:___________     Time:__________    [place] [facility] Departments:    ___[place]: [phone]  ___[facility]: [phone]  ___[place]: [phone] ___[facility]: [phone]  ___[facility]: [phone]  ___[facility]: [phone]  ___[phone]  ___[facility]: [phone]  ___[phone]

## 2018-10-12 LAB
IGG SERPL-MCNC: 1040 MG/DL (ref 695–1620)
IGG1 SER-MCNC: 554 MG/DL (ref 300–856)
IGG2 SER-MCNC: 375 MG/DL (ref 158–761)
IGG3 SER-MCNC: 50 MG/DL (ref 24–192)
IGG4 SER-MCNC: 32 MG/DL (ref 11–86)

## 2018-10-15 ENCOUNTER — TELEPHONE (OUTPATIENT)
Dept: GASTROENTEROLOGY | Facility: CLINIC | Age: 78
End: 2018-10-15

## 2018-10-15 NOTE — TELEPHONE ENCOUNTER
[facility] RN Care Coordination Note:    Called and spoke with patient and wife. Informed them the labs were within normal limits. Informed them he will need to discuss his coumadin with his PCP to ensure he can hold his medication for 5 prior to procedure and to complete his H&P. They verbalized understanding and have no further questions.     Sophia Armstrong RN   Care Coordinator   [phone]

## 2018-10-15 NOTE — TELEPHONE ENCOUNTER
Fransico is informed that he is scheduled on 11/15/18 at 240 P with an arrival time of 1240 A.  Patient instructed to get a pre-op physical done prior to this procedure, to arrange for a  and someone to monitor her for at least 24 hours post.   All instructions along with the pre-op form will be mailed to patient at his address listed in EPIC, it was confirmed during this call to be current.    SR 10/15/18 @ 301 P

## 2018-10-15 NOTE — TELEPHONE ENCOUNTER
[facility] Call Center    Phone Message    May a detailed message be left on voicemail: yes    Reason for Call: Requesting Results   Name/type of test: Labs  Date of test: 10/10/18  Was test done at a location other than [facility] (Please fill in the location if not [facility])?: No      Action Taken: Message routed to:  [facility] ([facility]): MARLO CONTRERAS

## 2018-11-14 ENCOUNTER — ANESTHESIA EVENT (OUTPATIENT)
Dept: SURGERY | Facility: CLINIC | Age: 78
End: 2018-11-14
Payer: MEDICARE

## 2018-11-14 ASSESSMENT — COPD QUESTIONNAIRES: COPD: 0

## 2018-11-14 ASSESSMENT — ENCOUNTER SYMPTOMS: DYSRHYTHMIAS: 1

## 2018-11-14 ASSESSMENT — LIFESTYLE VARIABLES: TOBACCO_USE: 0

## 2018-11-15 ENCOUNTER — HOSPITAL ENCOUNTER (OUTPATIENT)
Facility: CLINIC | Age: 78
Discharge: HOME | End: 2018-11-15
Attending: INTERNAL MEDICINE | Admitting: INTERNAL MEDICINE
Payer: MEDICARE

## 2018-11-15 ENCOUNTER — ANESTHESIA (OUTPATIENT)
Dept: SURGERY | Facility: CLINIC | Age: 78
End: 2018-11-15
Payer: MEDICARE

## 2018-11-15 ENCOUNTER — SURGERY (OUTPATIENT)
Age: 78
End: 2018-11-15

## 2018-11-15 VITALS
BODY MASS INDEX: 28.55 KG/M2 | OXYGEN SATURATION: 99 % | RESPIRATION RATE: 16 BRPM | DIASTOLIC BLOOD PRESSURE: 93 MMHG | HEART RATE: 64 BPM | SYSTOLIC BLOOD PRESSURE: 146 MMHG | WEIGHT: 215.39 LBS | TEMPERATURE: 98 F | HEIGHT: 73 IN

## 2018-11-15 LAB
BUN SERPL-MCNC: 18 MG/DL (ref 7–30)
CREAT SERPL-MCNC: 1.12 MG/DL (ref 0.66–1.25)
ERYTHROCYTE [DISTWIDTH] IN BLOOD BY AUTOMATED COUNT: 13.8 % (ref 10–15)
GFR SERPL CREATININE-BSD FRML MDRD: 64 ML/MIN/1.7M2
GLUCOSE BLDC GLUCOMTR-MCNC: 90 MG/DL (ref 70–99)
HCT VFR BLD AUTO: 38.5 % (ref 40–53)
HGB BLD-MCNC: 12.4 G/DL (ref 13.3–17.7)
INR PPP: 1.26 (ref 0.86–1.14)
MCH RBC QN AUTO: 30.4 PG (ref 26.5–33)
MCHC RBC AUTO-ENTMCNC: 32.2 G/DL (ref 31.5–36.5)
MCV RBC AUTO: 94 FL (ref 78–100)
PLATELET # BLD AUTO: 154 10E9/L (ref 150–450)
POTASSIUM SERPL-SCNC: 4.2 MMOL/L (ref 3.4–5.3)
RBC # BLD AUTO: 4.08 10E12/L (ref 4.4–5.9)
UPPER EUS: NORMAL
WBC # BLD AUTO: 5.2 10E9/L (ref 4–11)

## 2018-11-15 PROCEDURE — 25000125 ZZHC RX 250: Performed by: INTERNAL MEDICINE

## 2018-11-15 PROCEDURE — 85610 PROTHROMBIN TIME: CPT | Performed by: INTERNAL MEDICINE

## 2018-11-15 PROCEDURE — 27210794 ZZH OR GENERAL SUPPLY STERILE: Performed by: INTERNAL MEDICINE

## 2018-11-15 PROCEDURE — 84132 ASSAY OF SERUM POTASSIUM: CPT | Performed by: INTERNAL MEDICINE

## 2018-11-15 PROCEDURE — 36415 COLL VENOUS BLD VENIPUNCTURE: CPT | Performed by: INTERNAL MEDICINE

## 2018-11-15 PROCEDURE — 82565 ASSAY OF CREATININE: CPT | Performed by: INTERNAL MEDICINE

## 2018-11-15 PROCEDURE — 37000009 ZZH ANESTHESIA TECHNICAL FEE, EACH ADDTL 15 MIN: Performed by: INTERNAL MEDICINE

## 2018-11-15 PROCEDURE — 40000170 ZZH STATISTIC PRE-PROCEDURE ASSESSMENT II: Performed by: INTERNAL MEDICINE

## 2018-11-15 PROCEDURE — 40000065 ZZH STATISTIC EKG NON-CHARGEABLE

## 2018-11-15 PROCEDURE — 36000053 ZZH SURGERY LEVEL 2 EA 15 ADDTL MIN - UMMC: Performed by: INTERNAL MEDICINE

## 2018-11-15 PROCEDURE — 85027 COMPLETE CBC AUTOMATED: CPT | Performed by: INTERNAL MEDICINE

## 2018-11-15 PROCEDURE — 93010 ELECTROCARDIOGRAM REPORT: CPT | Performed by: INTERNAL MEDICINE

## 2018-11-15 PROCEDURE — 36000051 ZZH SURGERY LEVEL 2 1ST 30 MIN - UMMC: Performed by: INTERNAL MEDICINE

## 2018-11-15 PROCEDURE — 25000128 H RX IP 250 OP 636: Performed by: STUDENT IN AN ORGANIZED HEALTH CARE EDUCATION/TRAINING PROGRAM

## 2018-11-15 PROCEDURE — 93005 ELECTROCARDIOGRAM TRACING: CPT

## 2018-11-15 PROCEDURE — 71000027 ZZH RECOVERY PHASE 2 EACH 15 MINS: Performed by: INTERNAL MEDICINE

## 2018-11-15 PROCEDURE — 84520 ASSAY OF UREA NITROGEN: CPT | Performed by: INTERNAL MEDICINE

## 2018-11-15 PROCEDURE — 37000008 ZZH ANESTHESIA TECHNICAL FEE, 1ST 30 MIN: Performed by: INTERNAL MEDICINE

## 2018-11-15 PROCEDURE — 82962 GLUCOSE BLOOD TEST: CPT

## 2018-11-15 PROCEDURE — A9270 NON-COVERED ITEM OR SERVICE: HCPCS | Performed by: INTERNAL MEDICINE

## 2018-11-15 RX ORDER — PROPOFOL 10 MG/ML
INJECTION, EMULSION INTRAVENOUS CONTINUOUS PRN
Status: DISCONTINUED | OUTPATIENT
Start: 2018-11-15 | End: 2018-11-15

## 2018-11-15 RX ORDER — ONDANSETRON 2 MG/ML
4 INJECTION INTRAMUSCULAR; INTRAVENOUS EVERY 30 MIN PRN
Status: DISCONTINUED | OUTPATIENT
Start: 2018-11-15 | End: 2018-11-15 | Stop reason: HOSPADM

## 2018-11-15 RX ORDER — MEPERIDINE HYDROCHLORIDE 25 MG/ML
12.5 INJECTION INTRAMUSCULAR; INTRAVENOUS; SUBCUTANEOUS
Status: DISCONTINUED | OUTPATIENT
Start: 2018-11-15 | End: 2018-11-15 | Stop reason: HOSPADM

## 2018-11-15 RX ORDER — NALOXONE HYDROCHLORIDE 0.4 MG/ML
.1-.4 INJECTION, SOLUTION INTRAMUSCULAR; INTRAVENOUS; SUBCUTANEOUS
Status: DISCONTINUED | OUTPATIENT
Start: 2018-11-15 | End: 2018-11-15 | Stop reason: HOSPADM

## 2018-11-15 RX ORDER — SODIUM CHLORIDE, SODIUM LACTATE, POTASSIUM CHLORIDE, CALCIUM CHLORIDE 600; 310; 30; 20 MG/100ML; MG/100ML; MG/100ML; MG/100ML
INJECTION, SOLUTION INTRAVENOUS CONTINUOUS
Status: DISCONTINUED | OUTPATIENT
Start: 2018-11-15 | End: 2018-11-15 | Stop reason: HOSPADM

## 2018-11-15 RX ORDER — FLUMAZENIL 0.1 MG/ML
0.2 INJECTION, SOLUTION INTRAVENOUS
Status: DISCONTINUED | OUTPATIENT
Start: 2018-11-15 | End: 2018-11-15 | Stop reason: HOSPADM

## 2018-11-15 RX ORDER — LIDOCAINE 40 MG/G
CREAM TOPICAL
Status: DISCONTINUED | OUTPATIENT
Start: 2018-11-15 | End: 2018-11-15 | Stop reason: HOSPADM

## 2018-11-15 RX ORDER — FENTANYL CITRATE 50 UG/ML
25-50 INJECTION, SOLUTION INTRAMUSCULAR; INTRAVENOUS EVERY 5 MIN PRN
Status: DISCONTINUED | OUTPATIENT
Start: 2018-11-15 | End: 2018-11-15 | Stop reason: HOSPADM

## 2018-11-15 RX ORDER — SODIUM CHLORIDE, SODIUM LACTATE, POTASSIUM CHLORIDE, CALCIUM CHLORIDE 600; 310; 30; 20 MG/100ML; MG/100ML; MG/100ML; MG/100ML
INJECTION, SOLUTION INTRAVENOUS CONTINUOUS PRN
Status: DISCONTINUED | OUTPATIENT
Start: 2018-11-15 | End: 2018-11-15

## 2018-11-15 RX ORDER — ONDANSETRON 4 MG/1
4 TABLET, ORALLY DISINTEGRATING ORAL EVERY 30 MIN PRN
Status: DISCONTINUED | OUTPATIENT
Start: 2018-11-15 | End: 2018-11-15 | Stop reason: HOSPADM

## 2018-11-15 RX ORDER — FENTANYL CITRATE 50 UG/ML
INJECTION, SOLUTION INTRAMUSCULAR; INTRAVENOUS PRN
Status: DISCONTINUED | OUTPATIENT
Start: 2018-11-15 | End: 2018-11-15

## 2018-11-15 RX ORDER — PROPOFOL 10 MG/ML
INJECTION, EMULSION INTRAVENOUS PRN
Status: DISCONTINUED | OUTPATIENT
Start: 2018-11-15 | End: 2018-11-15

## 2018-11-15 RX ADMIN — PROPOFOL 50 MG: 10 INJECTION, EMULSION INTRAVENOUS at 14:04

## 2018-11-15 RX ADMIN — PROPOFOL 100 MCG/KG/MIN: 10 INJECTION, EMULSION INTRAVENOUS at 14:04

## 2018-11-15 RX ADMIN — SODIUM CHLORIDE, POTASSIUM CHLORIDE, SODIUM LACTATE AND CALCIUM CHLORIDE: 600; 310; 30; 20 INJECTION, SOLUTION INTRAVENOUS at 13:52

## 2018-11-15 RX ADMIN — PROPOFOL 50 MG: 10 INJECTION, EMULSION INTRAVENOUS at 14:17

## 2018-11-15 RX ADMIN — FENTANYL CITRATE 25 MCG: 50 INJECTION, SOLUTION INTRAMUSCULAR; INTRAVENOUS at 14:08

## 2018-11-15 RX ADMIN — SIMETHICONE 4 ML: 63.3; 3.7 SOLUTION/ DROPS ORAL at 14:13

## 2018-11-15 NOTE — ANESTHESIA PREPROCEDURE EVALUATION
Anesthesia Pre-Procedure Evaluation    Patient: Fransico Scales   MRN:     [id number] Gender:   male   Age:    78 year old :      1940        Preoperative Diagnosis: Pancreatitis    Procedure(s):  Endoscopic Ultrasound with possible fine needle aspiration     Past Medical History:   Diagnosis Date     Acute pancreatitis 2007 and February     Essential hypertension, benign      GERD (gastroesophageal reflux disease)      Gout      Gout, unspecified      Hypertension      Localized osteoarthrosis not specified whether primary or secondary, lower leg 07    Hospitalized     Other and unspecified malignant neoplasm of skin of other and unspecified parts of face      Other forms of migraine, with intractable migraine, so stated, without mention of status migrainosus      Restless leg syndrome      Restless legs syndrome (RLS)      Squamous cell carcinoma       Past Surgical History:   Procedure Laterality Date     C TOTAL KNEE ARTHROPLASTY  07    LT     HC REMOVAL GALLBLADDER       MOHS MICROGRAPHIC PROCEDURE            Anesthesia Evaluation     . Pt has had prior anesthetic. Type: General    No history of anesthetic complications          ROS/MED HX    ENT/Pulmonary:      (-) tobacco use, asthma, COPD and sleep apnea   Neurologic:      (-) CVA and Neuropathy   Cardiovascular:     (+) hypertension----. Taking blood thinners : . . . :. dysrhythmias a-fib, .       METS/Exercise Tolerance:     Hematologic:     (+) Anemia, -      Musculoskeletal:  - neg musculoskeletal ROS       GI/Hepatic:     (+) GERD Other GI/Hepatic pancreatitis      Renal/Genitourinary:  - ROS Renal section negative       Endo:  - neg endo ROS       Psychiatric:  - neg psychiatric ROS       Infectious Disease:  - neg infectious disease ROS       Malignancy:   (+) Malignancy History of Skin  Skin CA Remission status post Surgery,         Other:    (+) no H/O Chronic Pain,no other significant disability                         PHYSICAL EXAM:   Mental Status/Neuro: A/A/O   Airway: Facies: Feasible  Mallampati: I  Mouth/Opening: Full  TM distance: > 6 cm  Neck ROM: Full   Respiratory: Auscultation: CTAB     Resp. Rate: Normal     Resp. Effort: Normal      CV:    Comments:      Dental: Normal                  Lab Results   Component Value Date    WBC 8.3 05/22/2007    HGB 10.0 (L) 05/23/2007    HCT 42.0 05/14/2007     05/14/2007     05/14/2007    POTASSIUM 4.1 05/14/2007    CHLORIDE 103 05/14/2007    CO2 28 05/14/2007    BUN 14 05/14/2007    CR 1.01 05/14/2007    GLC 94 05/14/2007    KASI 8.9 05/14/2007    PTT 44 (H) 05/14/2007    INR 1.99 (H) 06/12/2007       Preop Vitals  BP Readings from Last 3 Encounters:   10/10/18 147/82   12/18/14 110/66   10/03/11 135/70    Pulse Readings from Last 3 Encounters:   10/10/18 69   12/18/14 70   10/03/11 61      Resp Readings from Last 3 Encounters:   No data found for Resp    SpO2 Readings from Last 3 Encounters:   10/10/18 96%      Temp Readings from Last 1 Encounters:   10/10/18 36.3  C (97.4  F) (Oral)    Ht Readings from Last 1 Encounters:   10/10/18 1.829 m (6')      Wt Readings from Last 1 Encounters:   10/10/18 98.9 kg (218 lb 1.6 oz)    Estimated body mass index is 29.58 kg/(m^2) as calculated from the following:    Height as of 10/10/18: 1.829 m (6').    Weight as of 10/10/18: 98.9 kg (218 lb 1.6 oz).     LDA:            Assessment:   ASA SCORE: 2    NPO Status: > 6 hours since completed Solid Foods; > 2 hours since completed Clear Liquids   Documentation: H&P complete; Preop Testing complete; Consents complete   Proceeding: Proceed without further delay  Tobacco Use:  NO Active use of Tobacco/UNKNOWN Tobacco use status     Plan:   Anes. Type:  MAC      Induction:  Not applicable; IV (Standard)   Airway: Native Airway   Access/Monitoring: PIV   Maintenance: Propofol   Emergence: Procedure Site   Logistics: Same Day Surgery     Postop Pain/Sedation Strategy:  Standard-Options:  Opioids PRN     PONV Management:  Adult Risk Factors:, Non-Smoker, Postop Opioids  Prevention: Propofol Infusion     CONSENT: Direct conversation   Plan and risks discussed with: Patient   Blood Products: Consent Deferred (Minimal Blood Loss)                         Fabiola Lott MD       History and physical assessed; Patient examined. I have reviewed and agree with this pre-op assessment and anesthetic plan with addendums as necessary.     Risks and alternatives presented and discussed. Patient and family agree. All questions answered.      Nolan Green MD  Staff Anesthesiologist  *[phone]

## 2018-11-15 NOTE — ANESTHESIA POSTPROCEDURE EVALUATION
Anesthesia POST Procedure Evaluation    Patient: Fransico Garcia   MRN:     [id number] Gender:   male   Age:    78 year old :      1940        Preoperative Diagnosis: Pancreatitis    Procedure(s):  Upper Endoscopic Ultrasound   Postop Comments: No value filed.       Anesthesia Type:  General    Reportable Event: NO     PAIN: Uncomplicated   Sign Out status: Comfortable, Well controlled pain     PONV: No PONV   Sign Out status:  No Nausea or Vomiting     Neuro/Psych: Uneventful perioperative course   Sign Out Status: Preoperative baseline; Age appropriate mentation     Airway/Resp.: Uneventful perioperative course   Sign Out Status: Non labored breathing, age appropriate RR; Resp. Status within EXPECTED Parameters     CV: Uneventful perioperative course   Sign Out status: Appropriate BP and perfusion indices; Appropriate HR/Rhythm     Disposition:   Sign Out in:  Phase II  Disposition:  Home  Recovery Course: Uneventful  Follow-Up: Not required           Last Anesthesia Record Vitals:  CRNA VITALS  11/15/2018 1410 - 11/15/2018 1510      11/15/2018             Pulse: 74    Ht Rate: 68    SpO2: 97 %          Last PACU/Preop Vitals:  Vitals:    11/15/18 1222 11/15/18 1445 11/15/18 1500   BP: 131/79 131/75 141/81   Pulse: 64     Resp: 14 16 16   Temp: 36.8  C (98.3  F) 36.5  C (97.7  F)    SpO2: 96% 98% 98%         Electronically Signed By: Nolan Green MD, November 15, 2018, 3:10 PM

## 2018-11-15 NOTE — ANESTHESIA CARE TRANSFER NOTE
Patient: Fransico Garcia    Procedure(s):  Upper Endoscopic Ultrasound    Diagnosis: Pancreatitis   Diagnosis Additional Information: No value filed.    Anesthesia Type:   No value filed.     Note:  Airway :Nasal Cannula  Patient transferred to:Phase II  Handoff Report: Identifed the Patient, Identified the Reponsible Provider, Reviewed the pertinent medical history, Discussed the surgical course, Reviewed Intra-OP anesthesia mangement and issues during anesthesia, Set expectations for post-procedure period and Allowed opportunity for questions and acknowledgement of understanding      Vitals: (Last set prior to Anesthesia Care Transfer)    CRNA VITALS  11/15/2018 1410 - 11/15/2018 1449      11/15/2018             Pulse: 74    Ht Rate: 68    SpO2: 97 %                Electronically Signed By: Fabiola Lott MD  November 15, 2018  2:49 PM

## 2018-11-15 NOTE — DISCHARGE INSTRUCTIONS
Dr. Boyd's Recommendations:   - Follow up in clinic in 1 year with Dr. Boyd    - Will obtain an MRCP in 1 year for pancreatic cyst surveillance at that time    - Warfarin (Coumadin) may be resumed today   [facility], [place]  Same-Day Surgery   Adult Discharge Orders & Instructions     For 24 hours after surgery    1. Get plenty of rest.  A responsible adult must stay with you for at least 24 hours after you leave the hospital.   2. Do not drive or use heavy equipment.  If you have weakness or tingling, don't drive or use heavy equipment until this feeling goes away.  3. Do not drink alcohol.  4. Avoid strenuous or risky activities.  Ask for help when climbing stairs.   5. You may feel lightheaded.  IF so, sit for a few minutes before standing.  Have someone help you get up.   6. If you have nausea (feel sick to your stomach): Drink only clear liquids such as apple juice, ginger ale, broth or 7-Up.  Rest may also help.  Be sure to drink enough fluids.  Move to a regular diet as you feel able.  7. You may have a slight fever. Call the doctor if your fever is over 100 F (37.7 C) (taken under the tongue) or lasts longer than 24 hours.  8. You may have a dry mouth, a sore throat, muscle aches or trouble sleeping.  These should go away after 24 hours.  9. Do not make important or legal decisions.   Call your doctor for any of the followin.  Signs of infection (fever, growing tenderness at the surgery site, a large amount of drainage or bleeding, severe pain, foul-smelling drainage, redness, swelling).    2. It has been over 8 to 10 hours since surgery and you are still not able to urinate (pass water).    3.  Headache for over 24 hours.      To contact a doctor, call Dr. Boyd [phone] or:        [phone] and ask for the resident on call for Gastroenterology (answered 24 hours a day)      Emergency Department:    [facility]: [phone]       (TTY for hearing  impaired: [phone])

## 2018-11-15 NOTE — BRIEF OP NOTE
Upper EUS 11/15/2018  1:52 PM [facility], [address] [place] [address] [phone]     Endoscopy Department   _______________________________________________________________________________   Patient Name: Fransico Scales         Procedure Date: 11/15/2018 1:52 PM   MRN: [id number]                       Account Number: [id number]   YOB: 1940               Admit Type: Outpatient   Age: 78                               Room: OR   Gender: Male                          Note Status: Finalized   Attending MD: Dontae Boyd MD       Pause for the Cause: time out performed   Total Sedation Time:                     _______________________________________________________________________________       Procedure:           Upper EUS   Indications:         Idiopathic recurrent acute pancreatitis; 78 year old                        male with a PMHx of idiopathic acute pancreatitis x 2 in                        2007 (thought to be gallstone related), s/p                        cholecystectomy, h/o Schatzki's ring s/p prior dilation                        and atrial fibrillation on warfarin who was referred                        after an episodes of epigastric pain and CT showing                        possible focal pancreatitis in the head on 8/6/18. His                        symptom presentation and CT imaging in early August are                        both compatible with very mild acute interstitial                        pancreatitis and meets two out of the 3 criteria. MRCP                        two months later showed multiple pancreatic cysts but                        were otherwise unremarkable. Plan for EUS to rule out                        other structural cases for recurrent acute pancreatitis.   Providers:           Dontae Boyd MD   Referring MD:        Aden Castellanos MD   Medicines:           Monitored Anesthesia Care   Complications:       No  immediate complications. Estimated blood loss: None.   _______________________________________________________________________________   Procedure:           Pre-Anesthesia Assessment:                        - Prior to the procedure, a History and Physical was                        performed, and patient medications and allergies were                        reviewed. The patient is competent. The risks and                        benefits of the procedure and the sedation options and                        risks were discussed with the patient. All questions                        were answered and informed consent was obtained. Patient                        identification and proposed procedure were verified by                        the physician, the nurse, the anesthesiologist and the                        anesthetist in the procedure room. Mental Status                        Examination: alert and oriented. Airway Examination:                        normal oropharyngeal airway and neck mobility.                        Respiratory Examination: clear to auscultation. CV                        Examination: normal. Prophylactic Antibiotics: The                        patient does not require prophylactic antibiotics. Prior                        Anticoagulants: The patient has taken Coumadin                        (warfarin), last dose was 5 days prior to procedure. ASA                        Grade Assessment: III - A patient with severe systemic                        disease. After reviewing the risks and benefits, the                        patient was deemed in satisfactory condition to undergo                        the procedure. The anesthesia plan was to use monitored                        anesthesia care (MAC). Immediately prior to                        administration of medications, the patient was                        re-assessed for adequacy to receive sedatives. The heart                         rate, respiratory rate, oxygen saturations, blood                        pressure, adequacy of pulmonary ventilation, and                        response to care were monitored throughout the                        procedure. The physical status of the patient was                        re-assessed after the procedure.                        After obtaining informed consent, the endoscope was                        passed under direct vision. Throughout the procedure,                        the patient's blood pressure, pulse, and oxygen                        saturations were monitored continuously. The                        Duodenoscope was introduced through the mouth, and                        advanced to the second part of duodenum. The upper EUS                        was accomplished without difficulty. The patient                        tolerated the procedure well.                                                                                     Findings:        Endosonographic Finding :        Endoscopic exam with the side viewing echoendoscope was normal. The        major papilla had evidence of a likely prior sphincterotomy but was        otherwise normal endoscopically and sonographically.        The bile duct was non-dilated and measured 4 mm in maximal diameter. The        gallbladder was absent. There were no stones or sludge in the bile duct.        The pancreatic parenchyma had hyperechoic stranding and at least one        hyperechoic foci with shadowing was seen possibly in a sidebranch of the        PD in the head. No masses or stones within the main duct were visualized        in the pancreas. The main pancreatic duct was followed from the major        papilla to the body, excluding pancreas divisum. The minor papilla was        visualized and endoscopically and sonographically it was normal. There        may have been a small hyperechoic foci off a sidebranch from the duct of         [place]. The pancreatic duct measured 2.5 mm in the head, 1.6 mm in        the body and 1 mm in the tail of the pancreas.        Anechoic lesions suggestive of multiple cysts were identified in the        pancreatic head, pancreatic body and pancreatic tail. The largest lesion        measured 10 mm by 9 mm in maximal cross-sectional diameter in the        pancreatic head with a faint smooth-edged projection with a hyperechoic        rim within the cyst consistent with a mucus plug. A 6mm x 5 mm cyst was        seen in the neck with a mucus ball. A 9 mm x 9 mm cyst was seen in the        body. A 8 mm x 5 mm cyst was seen in the tail also with a mucus ball.        There was no associated mass.        No lymph nodes were seen in the upper abdomen and mediastinum.        No masses were seen in the visualized portions of the liver.        An accessory spleen was visualized endosonographically. It measured 11        mm by 10 mm in maximal cross-sectional diameter.                                                                                     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 Largest cyst was 10 mm in                        the head. No PD dilation seen                        - No structural abnormalities seen to explain episodes                        of recurrent acute pancreatitis.                        - Normal bile duct with evidence of a prior biliary                        sphincterotomy                        - Hyperechoic foci with shadowing, hyperechoic                        stranding, and cysts seen in the pancreas meeting one                        major A and 2 minor [place] criteria which is                        suggestive of chronic pancreatitis.   Recommendation:      - Discharge patient to home (ambulatory).                         - Follow up in clinic in 1 year with Dr. Boyd                        - Will obtain and MRCP in 1 year for pancreatic cyst                        surveillance at that time                        - Warfarin may be resumed today

## 2018-11-16 ENCOUNTER — CARE COORDINATION (OUTPATIENT)
Dept: GASTROENTEROLOGY | Facility: CLINIC | Age: 78
End: 2018-11-16

## 2018-11-16 LAB — INTERPRETATION ECG - MUSE: NORMAL

## 2018-11-16 NOTE — PROGRESS NOTES
[facility] RN Care Coordination Note:    Received follow up request from Dr. Boyd that patient should be scheduled for 1 year follow up in clinic with a repeat MRI/MRCP prior to the clinic visit for pancreas cyst surveillance.     Called and left a detailed message for patient confirming follow up recommendations and informing him we will contact him closer to the follow up time to set these appointments up. Left my direct number for pt to call should he have any further questions.      Sophia Armstrong RN   Care Coordinator   [phone]

## 2018-12-26 NOTE — IP AVS SNAPSHOT
Same Day Surgery [address] [address]    Phone:  [phone]                                       After Visit Summary   11/15/2018    Fransico Garcia    MRN: [id number]           After Visit Summary Signature Page     I have received my discharge instructions, and my questions have been answered. I have discussed any challenges I see with this plan with the nurse or doctor.    ..........................................................................................................................................  Patient/Patient Representative Signature      ..........................................................................................................................................  Patient Representative Print Name and Relationship to Patient    ..................................................               ................................................  Date                                   Time    ..........................................................................................................................................  Reviewed by Signature/Title    ...................................................              ..............................................  Date                                               Time          22EPIC Rev 08/18         none

## 2019-08-07 ENCOUNTER — CARE COORDINATION (OUTPATIENT)
Dept: GASTROENTEROLOGY | Facility: CLINIC | Age: 79
End: 2019-08-07

## 2019-08-07 DIAGNOSIS — K86.9 PANCREATIC LESION: Primary | ICD-10-CM

## 2019-08-08 ENCOUNTER — CARE COORDINATION (OUTPATIENT)
Dept: GASTROENTEROLOGY | Facility: CLINIC | Age: 79
End: 2019-08-08

## 2019-08-09 ENCOUNTER — TELEPHONE (OUTPATIENT)
Dept: GASTROENTEROLOGY | Facility: CLINIC | Age: 79
End: 2019-08-09

## 2019-10-02 ENCOUNTER — OFFICE VISIT (OUTPATIENT)
Dept: GASTROENTEROLOGY | Facility: CLINIC | Age: 79
End: 2019-10-02
Payer: COMMERCIAL

## 2019-10-02 ENCOUNTER — HOSPITAL ENCOUNTER (OUTPATIENT)
Dept: MRI IMAGING | Facility: CLINIC | Age: 79
Discharge: HOME | End: 2019-10-02
Attending: INTERNAL MEDICINE | Admitting: INTERNAL MEDICINE
Payer: MEDICARE

## 2019-10-02 VITALS
DIASTOLIC BLOOD PRESSURE: 81 MMHG | TEMPERATURE: 97.7 F | BODY MASS INDEX: 29.61 KG/M2 | WEIGHT: 223.4 LBS | OXYGEN SATURATION: 95 % | SYSTOLIC BLOOD PRESSURE: 149 MMHG | HEIGHT: 73 IN | HEART RATE: 62 BPM

## 2019-10-02 DIAGNOSIS — K85.00 IDIOPATHIC ACUTE PANCREATITIS WITHOUT INFECTION OR NECROSIS: Primary | ICD-10-CM

## 2019-10-02 DIAGNOSIS — K86.9 PANCREATIC LESION: ICD-10-CM

## 2019-10-02 LAB
CREAT BLD-MCNC: 1.2 MG/DL (ref 0.66–1.25)
GFR SERPL CREATININE-BSD FRML MDRD: 59 ML/MIN/{1.73_M2}

## 2019-10-02 PROCEDURE — 82565 ASSAY OF CREATININE: CPT

## 2019-10-02 PROCEDURE — A9585 GADOBUTROL INJECTION: HCPCS | Performed by: INTERNAL MEDICINE

## 2019-10-02 PROCEDURE — 25500064 ZZH RX 255 OP 636: Performed by: INTERNAL MEDICINE

## 2019-10-02 PROCEDURE — 74183 MRI ABD W/O CNTR FLWD CNTR: CPT

## 2019-10-02 RX ORDER — FLUOROURACIL 50 MG/G
CREAM TOPICAL
COMMUNITY
Start: 2017-01-16

## 2019-10-02 RX ORDER — CALCIUM CARBONATE/VITAMIN D2 500 MG-125
TABLET ORAL
COMMUNITY
Start: 2012-04-25

## 2019-10-02 RX ORDER — GADOBUTROL 604.72 MG/ML
10 INJECTION INTRAVENOUS ONCE
Status: COMPLETED | OUTPATIENT
Start: 2019-10-02 | End: 2019-10-02

## 2019-10-02 RX ORDER — DIPHENOXYLATE HYDROCHLORIDE AND ATROPINE SULFATE 2.5; .025 MG/1; MG/1
TABLET ORAL
COMMUNITY
Start: 2007-08-22

## 2019-10-02 RX ADMIN — GADOBUTROL 10 ML: 604.72 INJECTION INTRAVENOUS at 12:29

## 2019-10-02 ASSESSMENT — PAIN SCALES - GENERAL: PAINLEVEL: NO PAIN (0)

## 2019-10-02 ASSESSMENT — MIFFLIN-ST. JEOR: SCORE: 1792.22

## 2019-10-02 ASSESSMENT — PATIENT HEALTH QUESTIONNAIRE - PHQ9
SUM OF ALL RESPONSES TO PHQ QUESTIONS 1-9: 0
SUM OF ALL RESPONSES TO PHQ QUESTIONS 1-9: 0
10. IF YOU CHECKED OFF ANY PROBLEMS, HOW DIFFICULT HAVE THESE PROBLEMS MADE IT FOR YOU TO DO YOUR WORK, TAKE CARE OF THINGS AT HOME, OR GET ALONG WITH OTHER PEOPLE: NOT DIFFICULT AT ALL

## 2019-10-02 NOTE — PROGRESS NOTES
INTERVENTIONAL ENDOSCOPY OUTPATIENT CLINIC FOLLOW-UP  DATE OF SERVICE: 10/2/2019  PHYSICIAN REQUESTING CONSULT: Miriam Danielle  Reason for Consultation: idiopathic recurrent acute pancreatitis; pancreatic cysts    ASSESSMENT:  79 year old male with a PMHx of idiopathic acute pancreatitis x 2 in 2007, s/p cholecystectomy, h/o Schatzki's ring s/p prior dilation and atrial fibrillation who was originally referred after an episode of mild interstitial idiopathic pancreatitis in Aug 2018. 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 He has been clinically doing well since then without further episodes of pancreatitis. I suspect his original episodes of pancreatitis in 2007 were probably gallstone induced although we do not have the records to help corroborate that. His episode last year appears to be idiopathic thus far. I reviewed his MRI/MRCP from today. The final radiology read is pending. By my eye, his pancreatic cysts are not appreciably different than his MRI from a year ago with the largest measuring ~11 mm and I do not see any other worrisome findings. I recommended just further imaging surveillance per guidelines with an MRI/MRCP in 2 years.    RECOMMENDATIONS:  - MRI/MRCP in 2 years with clinic follow up. If no change at that time, can likely discontinue ongoing surveillance.     Thank you for this consultation.  It was a pleasure to participate in the care of this patient; please contact us with any further questions.  A total of 30 minutes was spent in face to face evaluation with this patient, >50% of which was counseling and coordinating a management plan for this patient.     Dontae Boyd MD  [facility]  Division of Gastroenterology and Hepatology  [facility] [address], [place]  [address]    ________________________________________________________________  HPI:  Fransico Garcia is a 79 year old male with a history of idiopathic acute pancreatitis and pancreatic cysts who presents for follow up. Clinically, he has been doing well over the past year without any further episodes of pancreatitis or pain. He had two episodes of pancreatitis in 2007 that may have been related to gallstones and subsequently underwent cholecystectomy. He then had an episode of mild interstitial pancreatitis in Aug 2018. His work up was negative including and EUS. 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 Denies weight loss, nausea, vomiting, or diarrhea.     PMHx:  Past Medical History:   Diagnosis Date     Acute pancreatitis 2007 January and February     Essential hypertension, benign      GERD (gastroesophageal reflux disease)      Gout      Gout, unspecified      Hypertension      Localized osteoarthrosis not specified whether primary or secondary, lower leg 5/24/07    Hospitalized     Other and unspecified malignant neoplasm of skin of other and unspecified parts of face      Other forms of migraine, with intractable migraine, so stated, without mention of status migrainosus      Restless leg syndrome      Restless legs syndrome (RLS)      Squamous cell carcinoma        PSurgHx:  Past Surgical History:   Procedure Laterality Date     C TOTAL KNEE ARTHROPLASTY  05/21/07    LT     ENDOSCOPIC ULTRASOUND UPPER GASTROINTESTINAL TRACT (GI) N/A 11/15/2018    Procedure: Upper Endoscopic Ultrasound;  Surgeon: Dontae Boyd MD;  Location: [facility] OR     HC REMOVAL GALLBLADDER       MOHS MICROGRAPHIC PROCEDURE         MEDS:  Current Outpatient Medications   Medication     acetaminophen (TYLENOL ARTHRITIS PAIN) 650 MG CR tablet     allopurinol (ZYLOPRIM) 100 MG tablet     ALLOPURINOL 100 MG OR TABS     CARBIDOPA-LEVODOPA  MG OR TABS     cetirizine  "(ZYRTEC) 10 MG tablet     DOCUSATE SODIUM PO     FIBERCON 625 MG OR TABS     fluorouracil (EFUDEX) 5 % external cream     GLUCOSAMINE CHONDROITIN OR TABS     Glucosamine-Chondroit-Vit C-Mn ([employer] GLUCOSAMINE-CHONDROITIN) TABS     IMIQUIMOD 5 % EX CREA     ipratropium (ATROVENT) 0.03 % spray     Multiple Vitamin (MULTI-VITAMINS) TABS     MULTIVITAMIN OR     Nutritional Supplements (MELATONIN PO)     OMEPRAZOLE 20 MG OR CPDR     simvastatin (ZOCOR) 40 MG tablet     sotalol (BETAPACE) 120 MG tablet     terazosin (HYTRIN) 10 MG capsule     TERAZOSIN HCL 5 MG OR CAPS     TRAMADOL HCL 50 MG OR TABS     VANICREAM SPF 60 EX     warfarin (COUMADIN) 5 MG tablet     ZOLMITRIPTAN 2.5 MG OR TABS     No current facility-administered medications for this visit.      ALLERGIES:    Allergies   Allergen Reactions     Doxycycline Rash     Burning sensation on skin     Answers for HPI/ROS submitted by the patient on 10/2/2019   If you checked off any problems, how difficult have these problems made it for you to do your work, take care of things at home, or get along with other people?: Not difficult at all  PHQ9 TOTAL SCORE: 0      Physical Exam  BP (!) 149/81 (BP Location: Left arm, Patient Position: Chair, Cuff Size: Adult Regular)   Pulse 62   Temp 97.7  F (36.5  C) (Oral)   Ht 1.854 m (6' 1\")   Wt 101.3 kg (223 lb 6.4 oz)   SpO2 95%   BMI 29.47 kg/m    Body mass index is 29.47 kg/m .  Gen: A&Ox3, NAD  HEENT: Moist mucus membranes, no scleral icterus.  Lungs: no respiratory distress  Abd: soft, non-tender, non-distended.  No guarding/rigidity/rebound.  Skin: no jaundice, no stigmata of chronic liver disease  Ext: warm, dry, no evidence of edema    LABS:  Hospital Outpatient Visit on 10/02/2019   Component Date Value Ref Range Status     Creatinine 10/02/2019 1.2  0.66 - 1.25 mg/dL Final     GFR Estimate 10/02/2019 59* >60 mL/min/[1.73_m2] Final     GFR Estimate If Black 10/02/2019 71  >60 mL/min/[1.73_m2] Final         "

## 2019-10-02 NOTE — LETTER
RE: Fransico Garcia  [address] [address]     Dear Colleague,    Thank you for referring your patient, Fransico Garcia, to the [facility]. Please see a copy of my visit note below.    INTERVENTIONAL ENDOSCOPY OUTPATIENT CLINIC FOLLOW-UP    DATE OF SERVICE: 10/2/2019  PHYSICIAN REQUESTING CONSULT: Miriam Danielle  Reason for Consultation: idiopathic recurrent acute pancreatitis; pancreatic cysts    ASSESSMENT:  79 year old male with a PMHx of idiopathic acute pancreatitis x 2 in 2007, s/p cholecystectomy, h/o Schatzki's ring s/p prior dilation and atrial fibrillation who was originally referred after an episode of mild interstitial idiopathic pancreatitis in Aug 2018. 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 He has been clinically doing well since then without further episodes of pancreatitis. I suspect his original episodes of pancreatitis in 2007 were probably gallstone induced although we do not have the records to help corroborate that. His episode last year appears to be idiopathic thus far. I reviewed his MRI/MRCP from today. The final radiology read is pending. By my eye, his pancreatic cysts are not appreciably different than his MRI from a year ago with the largest measuring ~11 mm and I do not see any other worrisome findings. I recommended just further imaging surveillance per guidelines with an MRI/MRCP in 2 years.    RECOMMENDATIONS:  - MRI/MRCP in 2 years with clinic follow up. If no change at that time, can likely discontinue ongoing surveillance.     Thank you for this consultation.  It was a pleasure to participate in the care of this patient; please contact us with any further questions.  A total of 30 minutes was spent in face to face evaluation with this patient, >50% of which was counseling and  coordinating a management plan for this patient.     Dontae Boyd MD  [facility]  Division of Gastroenterology and Hepatology  [facility] [address], [place] [address]    ________________________________________________________________  HPI:  Fransico Garcia is a 79 year old male with a history of idiopathic acute pancreatitis and pancreatic cysts who presents for follow up. Clinically, he has been doing well over the past year without any further episodes of pancreatitis or pain. He had two episodes of pancreatitis in 2007 that may have been related to gallstones and subsequently underwent cholecystectomy. He then had an episode of mild interstitial pancreatitis in Aug 2018. His work up was negative including and EUS. 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 Denies weight loss, nausea, vomiting, or diarrhea.     PMHx:  Past Medical History:   Diagnosis Date     Acute pancreatitis 2007 January and February     Essential hypertension, benign      GERD (gastroesophageal reflux disease)      Gout      Gout, unspecified      Hypertension      Localized osteoarthrosis not specified whether primary or secondary, lower leg 5/24/07    Hospitalized     Other and unspecified malignant neoplasm of skin of other and unspecified parts of face      Other forms of migraine, with intractable migraine, so stated, without mention of status migrainosus      Restless leg syndrome      Restless legs syndrome (RLS)      Squamous cell carcinoma        PSurgHx:  Past Surgical History:   Procedure Laterality Date     C TOTAL KNEE ARTHROPLASTY  05/21/07    LT     ENDOSCOPIC ULTRASOUND UPPER GASTROINTESTINAL TRACT (GI) N/A 11/15/2018    Procedure: Upper Endoscopic Ultrasound;  Surgeon: Dontae Boyd MD;  Location: [facility] OR     HC REMOVAL GALLBLADDER       MOHS MICROGRAPHIC PROCEDURE         MEDS:  Current Outpatient  "Medications   Medication     acetaminophen (TYLENOL ARTHRITIS PAIN) 650 MG CR tablet     allopurinol (ZYLOPRIM) 100 MG tablet     ALLOPURINOL 100 MG OR TABS     CARBIDOPA-LEVODOPA  MG OR TABS     cetirizine (ZYRTEC) 10 MG tablet     DOCUSATE SODIUM PO     FIBERCON 625 MG OR TABS     fluorouracil (EFUDEX) 5 % external cream     GLUCOSAMINE CHONDROITIN OR TABS     Glucosamine-Chondroit-Vit C-Mn ([employer] GLUCOSAMINE-CHONDROITIN) TABS     IMIQUIMOD 5 % EX CREA     ipratropium (ATROVENT) 0.03 % spray     Multiple Vitamin (MULTI-VITAMINS) TABS     MULTIVITAMIN OR     Nutritional Supplements (MELATONIN PO)     OMEPRAZOLE 20 MG OR CPDR     simvastatin (ZOCOR) 40 MG tablet     sotalol (BETAPACE) 120 MG tablet     terazosin (HYTRIN) 10 MG capsule     TERAZOSIN HCL 5 MG OR CAPS     TRAMADOL HCL 50 MG OR TABS     VANICREAM SPF 60 EX     warfarin (COUMADIN) 5 MG tablet     ZOLMITRIPTAN 2.5 MG OR TABS     No current facility-administered medications for this visit.      ALLERGIES:    Allergies   Allergen Reactions     Doxycycline Rash     Burning sensation on skin     Answers for HPI/ROS submitted by the patient on 10/2/2019   If you checked off any problems, how difficult have these problems made it for you to do your work, take care of things at home, or get along with other people?: Not difficult at all  PHQ9 TOTAL SCORE: 0      Physical Exam  BP (!) 149/81 (BP Location: Left arm, Patient Position: Chair, Cuff Size: Adult Regular)   Pulse 62   Temp 97.7  F (36.5  C) (Oral)   Ht 1.854 m (6' 1\")   Wt 101.3 kg (223 lb 6.4 oz)   SpO2 95%   BMI 29.47 kg/m     Body mass index is 29.47 kg/m .  Gen: A&Ox3, NAD  HEENT: Moist mucus membranes, no scleral icterus.  Lungs: no respiratory distress  Abd: soft, non-tender, non-distended.  No guarding/rigidity/rebound.  Skin: no jaundice, no stigmata of chronic liver disease  Ext: warm, dry, no evidence of edema    LABS:  Hospital Outpatient Visit on 10/02/2019   Component Date Value " Ref Range Status     Creatinine 10/02/2019 1.2  0.66 - 1.25 mg/dL Final     GFR Estimate 10/02/2019 59* >60 mL/min/[1.73_m2] Final     GFR Estimate If Black 10/02/2019 71  >60 mL/min/[1.73_m2] Final       Again, thank you for allowing me to participate in the care of your patient.      Sincerely,    Dontae Boyd MD

## 2019-10-02 NOTE — NURSING NOTE
"Chief Complaint   Patient presents with     RECHECK     1yr f/u pancreatic lesion, EUS 11/2018       Vitals:    10/02/19 1315   BP: (!) 149/81   BP Location: Left arm   Patient Position: Chair   Cuff Size: Adult Regular   Pulse: 62   Temp: 97.7  F (36.5  C)   TempSrc: Oral   SpO2: 95%   Weight: 101.3 kg (223 lb 6.4 oz)   Height: 1.854 m (6' 1\")       Body mass index is 29.47 kg/m .    Andrea Menezes, EMT    "

## 2021-08-27 DIAGNOSIS — K85.00 IDIOPATHIC ACUTE PANCREATITIS WITHOUT INFECTION OR NECROSIS: Primary | ICD-10-CM

## 2021-08-31 ENCOUNTER — PATIENT OUTREACH (OUTPATIENT)
Dept: GASTROENTEROLOGY | Facility: CLINIC | Age: 81
End: 2021-08-31

## 2021-08-31 NOTE — PROGRESS NOTES
Pt wife called to schedule follow up MRI and clinic visit. Arranged Nov 24th at 9:40am with Dr Boyd. Pt's wife provided imaging scheduling number to arrange MRI prior to that visit, same day.    Message sent to clinic coordinators to schedule clinic date on hold    Tootie Vargas RN, BSN,   [facility]  Care coordinator

## 2021-11-19 ENCOUNTER — PATIENT OUTREACH (OUTPATIENT)
Dept: GASTROENTEROLOGY | Facility: CLINIC | Age: 81
End: 2021-11-19
Payer: COMMERCIAL

## 2021-11-19 NOTE — PROGRESS NOTES
Called pt mobile as a reminder for MRI and in person clinic visit on 11/24 with Dr Boyd. Left VM    Tootie Vargas, RN, BSN,   [facility]  Care coordinator

## 2021-11-24 ENCOUNTER — ANCILLARY PROCEDURE (OUTPATIENT)
Dept: MRI IMAGING | Facility: CLINIC | Age: 81
End: 2021-11-24
Attending: INTERNAL MEDICINE
Payer: COMMERCIAL

## 2021-11-24 ENCOUNTER — OFFICE VISIT (OUTPATIENT)
Dept: GASTROENTEROLOGY | Facility: CLINIC | Age: 81
End: 2021-11-24
Payer: MEDICARE

## 2021-11-24 VITALS
SYSTOLIC BLOOD PRESSURE: 138 MMHG | BODY MASS INDEX: 29.98 KG/M2 | OXYGEN SATURATION: 97 % | TEMPERATURE: 97.6 F | WEIGHT: 227.2 LBS | HEART RATE: 64 BPM | DIASTOLIC BLOOD PRESSURE: 72 MMHG

## 2021-11-24 DIAGNOSIS — K85.00 IDIOPATHIC ACUTE PANCREATITIS WITHOUT INFECTION OR NECROSIS: ICD-10-CM

## 2021-11-24 DIAGNOSIS — K86.2 PANCREATIC CYST: Primary | ICD-10-CM

## 2021-11-24 PROCEDURE — G0463 HOSPITAL OUTPT CLINIC VISIT: HCPCS

## 2021-11-24 PROCEDURE — 74183 MRI ABD W/O CNTR FLWD CNTR: CPT | Performed by: RADIOLOGY

## 2021-11-24 PROCEDURE — 99213 OFFICE O/P EST LOW 20 MIN: CPT | Performed by: INTERNAL MEDICINE

## 2021-11-24 PROCEDURE — A9585 GADOBUTROL INJECTION: HCPCS | Performed by: RADIOLOGY

## 2021-11-24 RX ORDER — GADOBUTROL 604.72 MG/ML
10 INJECTION INTRAVENOUS ONCE
Status: COMPLETED | OUTPATIENT
Start: 2021-11-24 | End: 2021-11-24

## 2021-11-24 RX ADMIN — GADOBUTROL 10 ML: 604.72 INJECTION INTRAVENOUS at 08:34

## 2021-11-24 ASSESSMENT — PAIN SCALES - GENERAL: PAINLEVEL: MILD PAIN (2)

## 2021-11-24 NOTE — PROGRESS NOTES
INTERVENTIONAL ENDOSCOPY OUTPATIENT CLINIC FOLLOW-UP  DATE OF SERVICE: 11/24/2021  PHYSICIAN REQUESTING CONSULT: Miriam Danielle  Reason for Consultation: idiopathic recurrent acute pancreatitis; pancreatic cysts    ASSESSMENT:  Fransico Garcia is a 81 year old male with a history of idiopathic acute pancreatitis and pancreatic cysts who presents for follow up for surveillance of likely side-branch IPMNs with the largest measuring 1.1 cm previously. He ha a repeat surveillance MRI today. I reviewed the images myself. I initially did not see any change in size but the final read from the radiologists showed a slight increase in the 1.1 cm cyst to 1.4 cm since 2019. I called the patient after his in person clinic visit to relay the final read results. No worrisome features seen to suggest malignancy. Because of the slight interval growth and his family history of pancreatic cancer, we should repeat his surveillance in 2 years and at that point if no change I would strongly recommend discontinuing surveillance given his age.     I suspect his original episodes of pancreatitis in 2007 were probably gallstone induced although we do not have the records to help corroborate that. His episode in 2018 appears to be idiopathic thus far. No further episodes of pancreatitis since.     RECOMMENDATIONS:  - MRI/MRCP in 2 years with clinic follow up. If no change at that time, can likely discontinue ongoing surveillance.     Thank you for this consultation.  It was a pleasure to participate in the care of this patient; please contact us with any further questions.  A total of 30 minutes was spent in face to face evaluation with this patient, >50% of which was counseling and coordinating a management plan for this patient.     Dontae Boyd MD  [facility]  Division of Gastroenterology and Hepatology  [facility] [address], [place]  [address]    ________________________________________________________________  HPI:  Fransico Garcia is a 81 year old male with a history of idiopathic acute pancreatitis and pancreatic cysts who presents for follow up for surveillance of likely side-branch IPMNs with the largest measuring 1.1 cm previously. He has been doing well since his last clinic visit without any new symptoms or episodes of pancreatitis. He had two episodes of pancreatitis in 2007 that may have been related to gallstones and subsequently underwent cholecystectomy. He then had an episode of mild interstitial pancreatitis in Aug 2018. His work up was negative including and EUS. 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 Denies weight loss, nausea, vomiting, or diarrhea. He had a surveillance MRI performed today.    PMHx:  Past Medical History:   Diagnosis Date     Acute pancreatitis 2007 January and February     Essential hypertension, benign      GERD (gastroesophageal reflux disease)      Gout      Gout, unspecified      Hypertension      Localized osteoarthrosis not specified whether primary or secondary, lower leg 5/24/07    Hospitalized     Other and unspecified malignant neoplasm of skin of other and unspecified parts of face      Other forms of migraine, with intractable migraine, so stated, without mention of status migrainosus      Restless leg syndrome      Restless legs syndrome (RLS)      Squamous cell carcinoma        PSurgHx:  Past Surgical History:   Procedure Laterality Date     C TOTAL KNEE ARTHROPLASTY  05/21/07    LT     ENDOSCOPIC ULTRASOUND UPPER GASTROINTESTINAL TRACT (GI) N/A 11/15/2018    Procedure: Upper Endoscopic Ultrasound;  Surgeon: Dontae Boyd MD;  Location: [facility] OR     HC REMOVAL GALLBLADDER       MOHS MICROGRAPHIC PROCEDURE         MEDS:  Current Outpatient Medications   Medication     acetaminophen (TYLENOL ARTHRITIS PAIN) 650 MG CR tablet      allopurinol (ZYLOPRIM) 100 MG tablet     ALLOPURINOL 100 MG OR TABS     CARBIDOPA-LEVODOPA  MG OR TABS     cetirizine (ZYRTEC) 10 MG tablet     DOCUSATE SODIUM PO     FIBERCON 625 MG OR TABS     fluorouracil (EFUDEX) 5 % external cream     GLUCOSAMINE CHONDROITIN OR TABS     Glucosamine-Chondroit-Vit C-Mn ([employer]S GLUCOSAMINE-CHONDROITIN) TABS     IMIQUIMOD 5 % EX CREA     ipratropium (ATROVENT) 0.03 % spray     Multiple Vitamin (MULTI-VITAMINS) TABS     MULTIVITAMIN OR     Nutritional Supplements (MELATONIN PO)     OMEPRAZOLE 20 MG OR CPDR     simvastatin (ZOCOR) 40 MG tablet     sotalol (BETAPACE) 120 MG tablet     terazosin (HYTRIN) 10 MG capsule     TERAZOSIN HCL 5 MG OR CAPS     TRAMADOL HCL 50 MG OR TABS     VANICREAM SPF 60 EX     warfarin (COUMADIN) 5 MG tablet     ZOLMITRIPTAN 2.5 MG OR TABS     No current facility-administered medications for this visit.     ALLERGIES:    Allergies   Allergen Reactions     Doxycycline Rash     Burning sensation on skin     Niacinamide Muscle Pain (Myalgia) and Nausea     Other reaction(s): Other (see comments), Weakness present, Muscle pain, Nausea  Weakness, muscle pain, nausea  Weakness, muscle pain, nausea       Answers for HPI/ROS submitted by the patient on 10/2/2019   If you checked off any problems, how difficult have these problems made it for you to do your work, take care of things at home, or get along with other people?: Not difficult at all  PHQ9 TOTAL SCORE: 0      Physical Exam  /72   Pulse 64   Temp 97.6  F (36.4  C) (Oral)   Wt 103.1 kg (227 lb 3.2 oz)   SpO2 97%   BMI 29.98 kg/m    Body mass index is 29.98 kg/m .  Gen: A&Ox3, NAD  HEENT: Moist mucus membranes, no scleral icterus.  Lungs: no respiratory distress  Abd: soft, non-tender, non-distended.  No guarding/rigidity/rebound.  Skin: no jaundice, no stigmata of chronic liver disease  Ext: warm, dry, no evidence of edema    LABS:  Hospital Outpatient Visit on 10/02/2019   Component Date  Value Ref Range Status     Creatinine 10/02/2019 1.2  0.66 - 1.25 mg/dL Final     GFR Estimate 10/02/2019 59* >60 mL/min/[1.73_m2] Final     GFR Estimate If Black 10/02/2019 71  >60 mL/min/[1.73_m2] Final     MRCP Without and With Contrast     CLINICAL HISTORY: Idiopathic acute pancreatitis without infection or  necrosis     DATE: 11/24/2021 9:18 AM     TECHNIQUE:      Images were acquired with and without intravenous gadolinium contrast  through the upper abdomen. The following MR images were acquired  without intravenous contrast: TrueFISP, multiplanar T2-weighted, axial  T1 in/out of phase, T2-weighted MRCP images, axial diffusion-weighted  and axial apparent diffusion coefficient. T1-weighted images were  obtained before contrast at the multiple time points following  contrast injection. 3-D reformatted images were generated by the  technologist. Contrast dose: 10ml  Gadavist     Comparison study:     FINDINGS:     Biliary Tree: No intra or extrahepatic biliary ductal dilatation.     Pancreas: Mild atrophy. Intrinsic T1 hyperintense signal is preserved.  Numerous scattered cysts throughout the pancreas. Largest one within  the neck/body on series 33 image 22 is slightly increased, measuring  1.4 cm, previously 1.1 cm. Other cysts are stable. No abnormal  enhancement to suggest malignant transformation. No main pancreatic  duct dilatation, measuring up to 3 mm.     Liver: No intratesticular masses are positioned. Scattered T2  hyperintense cysts, unchanged compared to prior study. No focal  hepatic lesion.     Gallbladder: Surgically absent.     Spleen: Unremarkable. Splenules.     Kidneys: Bilateral tiny T2 hyperintense cysts. No hydronephrosis. No  focal lesion.      Adrenal glands: Unremarkable.     Stomach: Small hiatal hernia.     Bowel: No dilated bowel loop.     Lymph nodes: No lymphadenopathy.     Blood vessels: Unremarkable.     Lung bases: Clear.     Bones and soft tissues: No suspicious  lesion.     Mesentery and abdominal wall: Unremarkable.     Ascites: None                                                                      IMPRESSION: Numerous scattered cysts throughout the pancreas. Largest  one within the neck/body is slightly increased, otherwise stable. No  abnormal enhancement to suggest malignant transformation.

## 2021-11-24 NOTE — DISCHARGE INSTRUCTIONS
MRI Contrast Discharge Instructions    The IV contrast you received today will pass out of your body in your  urine. This will happen in the next 24 hours. You will not feel this process.  Your urine will not change color.    Drink at least 4 extra glasses of water or juice today (unless your doctor  has restricted your fluids). This reduces the stress on your kidneys.  You may take your regular medicines.    If you are on dialysis: It is best to have dialysis today.    If you have a reaction: Most reactions happen right away. If you have  any new symptoms after leaving the hospital (such as hives or swelling),  call your hospital at the correct number below. Or call your family doctor.  If you have breathing distress or wheezing, call 911.    Special instructions: ***    I have read and understand the above information.    Signature:______________________________________ Date:___________    Staff:__________________________________________ Date:___________     Time:__________    [facility] Departments:    ___[place]: [phone]  ___[facility]: [phone]  ___[place]: [phone] ___[facility]: [phone]  ___[facility]: [phone]  ___[facility]: [phone]  ___[phone]  ___[facility]: [phone]  ___[phone]

## 2021-11-24 NOTE — NURSING NOTE
"Oncology Rooming Note    November 24, 2021 9:31 AM   Fransico Garcia is a 81 year old male who presents for:    Chief Complaint   Patient presents with     Oncology Clinic Visit     idiopathic acute pancreatitis without infection or necrosis     Initial Vitals: /72   Pulse 64   Temp 97.6  F (36.4  C) (Oral)   Wt 103.1 kg (227 lb 3.2 oz)   SpO2 97%   BMI 29.98 kg/m   Estimated body mass index is 29.98 kg/m  as calculated from the following:    Height as of 10/2/19: 1.854 m (6' 1\").    Weight as of this encounter: 103.1 kg (227 lb 3.2 oz). Body surface area is 2.3 meters squared.  Mild Pain (2) Comment: Data Unavailable   No LMP for male patient.  Allergies reviewed: Yes  Medications reviewed: Yes    Medications: Medication refills not needed today.  Pharmacy name entered into EPIC: [facility] - [place], [place] - [address]    Clinical concerns: none       Lisandra Barboza CMA            "

## 2023-08-28 ENCOUNTER — TELEPHONE (OUTPATIENT)
Dept: GASTROENTEROLOGY | Facility: CLINIC | Age: 83
End: 2023-08-28
Payer: COMMERCIAL

## 2023-08-28 DIAGNOSIS — K86.2 PANCREATIC CYST: Primary | ICD-10-CM

## 2023-08-28 NOTE — TELEPHONE ENCOUNTER
Per Dr Boyd's note from November 2021, pt due for repeat scan in 2 years.    RECOMMENDATIONS:  - MRI/MRCP in 2 years with clinic follow up. If no change at that time, can likely discontinue ongoing surveillance.     Pt scheduled for clinic with Dr Boyd in January 2024. Orders placed for MRI/MRCP per note.     Called patient with update, talked with wife. They will do imaging same day as clinic, transferred to imaging scheduling.    ML

## 2023-08-28 NOTE — TELEPHONE ENCOUNTER
[facility] Call [facility]    Phone Message    May a detailed message be left on voicemail: yes     Reason for Call: Other: Tootie from imaging  called to inform that pt is schedule with Dr Boyd in January 2024.  Needs order placed for the appt for imaging.      Action Taken: Other: samantha mukherjee    Travel Screening: Not Applicable

## 2023-10-17 NOTE — IP AVS SNAPSHOT
MRN:[id number]                      After Visit Summary   11/15/2018    Fransico Garcia    MRN: [id number]           Thank you!     Thank you for choosing [facility] for your care. Our goal is always to provide you with excellent care. Hearing back from our patients is one way we can continue to improve our services. Please take a few minutes to complete the written survey that you may receive in the mail after you visit with us. Thank you!        Patient Information     Date Of Birth          1940        About your hospital stay     You were admitted on:  November 15, 2018 You last received care in the:  Same Day Surgery [facility]    You were discharged on:  November 15, 2018       Who to Call     For medical emergencies, please call 911.  For non-urgent questions about your medical care, please call your primary care provider or clinic, [phone]  For questions related to your surgery, please call your surgery clinic        Attending Provider     Provider Dontae Swartz MD Gastroenterology       Primary Care Provider Office Phone # Fax #    Aden Castellanos [phone]      After Care Instructions     Discharge Instructions       No driving or operating machinery until the day after procedure.            Discharge Instructions       Recommend that a responsible adult remain with the patient at home for 24 hours post discharge.            Discharge Instructions       Start with clear liquids, sips of water 1 hour after procedure. If no abdominal pain and gag reflex has returned, advance as tolerated to pre-procedure diet.              Discharge Instructions       Restart home medications.            Discharge Instructions       Check with your Provider when to start anticoagulant medication.            Discharge Instructions       No ALCOHOL 24 hours post procedure.                  Further instructions from your care team       Dr. Boyd's  Recommendations:   - Follow up in clinic in 1 year with Dr. Boyd    - Will obtain an MRCP in 1 year for pancreatic cyst surveillance at that time    - Warfarin (Coumadin) may be resumed today   [facility] [place] [place]  Same-Day Surgery   Adult Discharge Orders & Instructions     For 24 hours after surgery    1. Get plenty of rest.  A responsible adult must stay with you for at least 24 hours after you leave the hospital.   2. Do not drive or use heavy equipment.  If you have weakness or tingling, don't drive or use heavy equipment until this feeling goes away.  3. Do not drink alcohol.  4. Avoid strenuous or risky activities.  Ask for help when climbing stairs.   5. You may feel lightheaded.  IF so, sit for a few minutes before standing.  Have someone help you get up.   6. If you have nausea (feel sick to your stomach): Drink only clear liquids such as apple juice, ginger ale, broth or 7-Up.  Rest may also help.  Be sure to drink enough fluids.  Move to a regular diet as you feel able.  7. You may have a slight fever. Call the doctor if your fever is over 100 F (37.7 C) (taken under the tongue) or lasts longer than 24 hours.  8. You may have a dry mouth, a sore throat, muscle aches or trouble sleeping.  These should go away after 24 hours.  9. Do not make important or legal decisions.   Call your doctor for any of the followin.  Signs of infection (fever, growing tenderness at the surgery site, a large amount of drainage or bleeding, severe pain, foul-smelling drainage, redness, swelling).    2. It has been over 8 to 10 hours since surgery and you are still not able to urinate (pass water).    3.  Headache for over 24 hours.      To contact a doctor, call Dr. Boyd [phone] or:        [phone] and ask for the resident on call for Gastroenterology (answered 24 hours a day)      Emergency Department:    [facility]: [phone]       (TTY for hearing impaired:  "[phone])            Pending Results     Date and Time Order Name Status Description    11/15/2018 1251 EKG 12-lead, tracing only Preliminary             Admission Information     Date & Time Provider Department Dept. Phone    11/15/2018 Dontae Boyd MD Same Day Surgery [facility] [phone]      Your Vitals Were     Blood Pressure Pulse Temperature Respirations Height Weight    141/81 64 97.7  F (36.5  C) (Oral) 16 1.854 m (6' 1\") 97.7 kg (215 lb 6.2 oz)    Pulse Oximetry BMI (Body Mass Index)                98% 28.42 kg/m2          MyChart Information     [employer] lets you send messages to your doctor, view your test results, renew your prescriptions, schedule appointments and more. To sign up, go to [email or web address] . Click on \"Log in\" on the left side of the screen, which will take you to the Welcome page. Then click on \"Sign up Now\" on the right side of the page.     You will be asked to enter the access code listed below, as well as some personal information. Please follow the directions to create your username and password.     Your access code is: [email or web address]  Expires: 12/10/2018  1:03 PM     Your access code will  in 90 days. If you need help or a new code, please call your [facility] clinic or [phone].        Care EveryWhere ID     This is your Care EveryWhere ID. This could be used by other organizations to access your [facility] medical records  [id number]        Equal Access to Services     HAYLEE GROVE : Hadii aureliano castelano Sosung, waaxda luqadaha, qaybta kaalmada adeegyahalley, kilo londono. So [facility] [phone].    ATENCIÓN: Si habla español, tiene a cunningham disposición servicios gratuitos de asistencia lingüística. Llame al [phone].    We comply with applicable federal civil rights laws and [place] laws. We do not discriminate on the basis of race, color, national origin, age, disability, sex, sexual orientation, or gender identity.          "      Review of your medicines      UNREVIEWED medicines. Ask your doctor about these medicines        Dose / Directions    ZOLMitriptan 2.5 MG tablet   Commonly known as:  ZOMIG        1 1/2 tab prn for migraine   Refills:  0         CONTINUE these medicines which have NOT CHANGED        Dose / Directions    * allopurinol 100 MG tablet   Commonly known as:  ZYLOPRIM        1 TABLET DAILY   Refills:  0       * allopurinol 100 MG tablet   Commonly known as:  ZYLOPRIM        Dose:  100 mg   Take 100 mg by mouth daily.   Refills:  0       carbidopa-levodopa  MG per tablet   Commonly known as:  SINEMET        1 tabs at bedtime for restless legs   Quantity:  0   Refills:  0       cetirizine 10 MG tablet   Commonly known as:  zyrTEC        Dose:  10 mg   Take 10 mg by mouth 2 times daily.   Refills:  0       DOCUSATE SODIUM PO        Take  by mouth as needed.   Refills:  0       FIBERCON 625 MG tablet   Generic drug:  calcium polycarbophil        1 tablet every morning   Refills:  0       GLUCOSAMINE CHONDROITIN Tabs        1 tablet twice daily   Refills:  0       imiquimod 5 % cream   Commonly known as:  ALDARA        apply to red scaly patches of the face 3 x weekly   Refills:  0       ipratropium 0.03 % spray   Commonly known as:  ATROVENT        Dose:  2 spray   Spray 2 sprays into both nostrils every 12 hours   Refills:  0       MELATONIN PO        Take  by mouth daily. Take one-half tablet   Refills:  0       MULTIVITAMIN PO        1 daily   Refills:  0       omeprazole 20 MG CR capsule   Commonly known as:  priLOSEC        1 CAPSULE DAILY   Refills:  0       simvastatin 40 MG tablet   Commonly known as:  ZOCOR        Dose:  40 mg   Take 40 mg by mouth At Bedtime. Take one-half tablet   Refills:  0       sotalol 120 MG tablet   Commonly known as:  BETAPACE        Dose:  120 mg   Take 120 mg by mouth   Refills:  0       * terazosin 5 MG capsule   Commonly known as:  HYTRIN   Used for:  Rotator cuff syndrome         1 CAPSULE AT BEDTIME   Refills:  0       * terazosin 10 MG capsule   Commonly known as:  HYTRIN        Dose:  10 mg   Take 10 mg by mouth At Bedtime.   Refills:  0       traMADol 50 MG tablet   Commonly known as:  ULTRAM        1 TABLET EVERY 4 TO 6 HOURS AS NEEDED   Refills:  0       TYLENOL ARTHRITIS PAIN 650 MG CR tablet   Generic drug:  acetaminophen        Dose:  650 mg   Take 650 mg by mouth. Take two tablets in the AM and two tablets in the PM.   Refills:  0       VANICREAM SPF 60 EX        None Entered   Refills:  0       warfarin 5 MG tablet   Commonly known as:  COUMADIN        Dose:  5 mg   Take 5 mg by mouth daily   Refills:  0       * Notice:  This list has 4 medication(s) that are the same as other medications prescribed for you. Read the directions carefully, and ask your doctor or other care provider to review them with you.             Protect others around you: Learn how to safely use, store and throw away your medicines at www.disposemymeds.org.             Medication List: This is a list of all your medications and when to take them. Check marks below indicate your daily home schedule. Keep this list as a reference.      Medications           Morning Afternoon Evening Bedtime As Needed    * allopurinol 100 MG tablet   Commonly known as:  ZYLOPRIM   1 TABLET DAILY                                * allopurinol 100 MG tablet   Commonly known as:  ZYLOPRIM   Take 100 mg by mouth daily.                                carbidopa-levodopa  MG per tablet   Commonly known as:  SINEMET   1 tabs at bedtime for restless legs                                cetirizine 10 MG tablet   Commonly known as:  zyrTEC   Take 10 mg by mouth 2 times daily.                                DOCUSATE SODIUM PO   Take  by mouth as needed.                                FIBERCON 625 MG tablet   1 tablet every morning   Generic drug:  calcium polycarbophil                                GLUCOSAMINE CHONDROITIN Tabs   1  tablet twice daily                                imiquimod 5 % cream   Commonly known as:  ALDARA   apply to red scaly patches of the face 3 x weekly                                ipratropium 0.03 % spray   Commonly known as:  ATROVENT   Spray 2 sprays into both nostrils every 12 hours                                MELATONIN PO   Take  by mouth daily. Take one-half tablet                                MULTIVITAMIN PO   1 daily                                omeprazole 20 MG CR capsule   Commonly known as:  priLOSEC   1 CAPSULE DAILY                                simvastatin 40 MG tablet   Commonly known as:  ZOCOR   Take 40 mg by mouth At Bedtime. Take one-half tablet                                sotalol 120 MG tablet   Commonly known as:  BETAPACE   Take 120 mg by mouth                                * terazosin 5 MG capsule   Commonly known as:  HYTRIN   1 CAPSULE AT BEDTIME                                * terazosin 10 MG capsule   Commonly known as:  HYTRIN   Take 10 mg by mouth At Bedtime.                                traMADol 50 MG tablet   Commonly known as:  ULTRAM   1 TABLET EVERY 4 TO 6 HOURS AS NEEDED                                TYLENOL ARTHRITIS PAIN 650 MG CR tablet   Take 650 mg by mouth. Take two tablets in the AM and two tablets in the PM.   Generic drug:  acetaminophen                                VANICREAM SPF 60 EX   None Entered                                warfarin 5 MG tablet   Commonly known as:  COUMADIN   Take 5 mg by mouth daily                                ZOLMitriptan 2.5 MG tablet   Commonly known as:  ZOMIG   1 1/2 tab prn for migraine                                * Notice:  This list has 4 medication(s) that are the same as other medications prescribed for you. Read the directions carefully, and ask your doctor or other care provider to review them with you.       Negative

## 2024-03-25 ENCOUNTER — TELEPHONE (OUTPATIENT)
Dept: GASTROENTEROLOGY | Facility: CLINIC | Age: 84
End: 2024-03-25
Payer: COMMERCIAL

## 2024-03-25 NOTE — TELEPHONE ENCOUNTER
Called to remind patient of their upcoming appointments - 7:45am MRI and 9:40am Clinic Visit on Wednesday, March 27th with Dr. Corinna Boyd MD. Both these appointments are scheduled as an in-person appt. Please arrive 15 minutes early to check in for your appointment. , if your appointment is virtual (video or telephone) you need to be in [place] for the visit. To reschedule or cancel patient to call [phone].    Simona Edouard

## 2024-03-27 ENCOUNTER — OFFICE VISIT (OUTPATIENT)
Dept: GASTROENTEROLOGY | Facility: CLINIC | Age: 84
End: 2024-03-27
Payer: MEDICARE

## 2024-03-27 ENCOUNTER — ANCILLARY PROCEDURE (OUTPATIENT)
Dept: MRI IMAGING | Facility: CLINIC | Age: 84
End: 2024-03-27
Attending: INTERNAL MEDICINE
Payer: COMMERCIAL

## 2024-03-27 VITALS
OXYGEN SATURATION: 98 % | HEART RATE: 67 BPM | TEMPERATURE: 97.8 F | BODY MASS INDEX: 31.92 KG/M2 | DIASTOLIC BLOOD PRESSURE: 74 MMHG | HEIGHT: 71 IN | RESPIRATION RATE: 16 BRPM | SYSTOLIC BLOOD PRESSURE: 126 MMHG | WEIGHT: 228 LBS

## 2024-03-27 DIAGNOSIS — K86.2 PANCREATIC CYST: Primary | ICD-10-CM

## 2024-03-27 DIAGNOSIS — K86.2 PANCREATIC CYST: ICD-10-CM

## 2024-03-27 PROCEDURE — 99214 OFFICE O/P EST MOD 30 MIN: CPT | Performed by: INTERNAL MEDICINE

## 2024-03-27 PROCEDURE — G0463 HOSPITAL OUTPT CLINIC VISIT: HCPCS | Performed by: INTERNAL MEDICINE

## 2024-03-27 PROCEDURE — A9585 GADOBUTROL INJECTION: HCPCS | Performed by: RADIOLOGY

## 2024-03-27 PROCEDURE — 74183 MRI ABD W/O CNTR FLWD CNTR: CPT | Performed by: RADIOLOGY

## 2024-03-27 RX ORDER — ATORVASTATIN CALCIUM 80 MG/1
80 TABLET, FILM COATED ORAL DAILY
COMMUNITY
Start: 2022-09-04

## 2024-03-27 RX ORDER — CARVEDILOL 25 MG/1
25 TABLET ORAL AT BEDTIME
COMMUNITY

## 2024-03-27 RX ORDER — PANTOPRAZOLE SODIUM 40 MG/1
40 TABLET, DELAYED RELEASE ORAL DAILY
COMMUNITY
Start: 2022-08-31

## 2024-03-27 RX ORDER — ASPIRIN 81 MG/1
81 TABLET, CHEWABLE ORAL DAILY
COMMUNITY
Start: 2022-09-04

## 2024-03-27 RX ORDER — GADOBUTROL 604.72 MG/ML
10 INJECTION INTRAVENOUS ONCE
Status: COMPLETED | OUTPATIENT
Start: 2024-03-27 | End: 2024-03-27

## 2024-03-27 RX ADMIN — GADOBUTROL 10 ML: 604.72 INJECTION INTRAVENOUS at 07:51

## 2024-03-27 ASSESSMENT — PAIN SCALES - GENERAL: PAINLEVEL: MODERATE PAIN (4)

## 2024-03-27 NOTE — LETTER
March 28, 2024      Fransico aGrcia  [address]  [place] [id number]        Dear ,    This was the final read from your MRI. They think maybe a 1 mm increase in a couple of the cysts, but I don't actually think there is really any difference. I still recommend what we talked about in clinic. I do not think you need another MRI.    Resulted Orders   MRI Abdomen w & w/o contrast mrcp    Narrative    Exam: MR ABDOMEN MRCP W/O & W CONTRAST, 3/28/2024 8:14 AM    Indication: Pancreatic cyst    Comparison: MRI 11/24/2021    Technique: Images were acquired with and without intravenous  gadolinium contrast through the upper abdomen. The following MR images  were acquired without intravenous contrast: TrueFISP, multiplanar  T2-weighted, axial T1 in/out of phase, T2-weighted MRCP images, axial  diffusion-weighted and axial apparent diffusion coefficient.  T1-weighted images were obtained before contrast at the multiple time  points following contrast injection. 3-D reformatted images were  generated by the technologist. Contrast dose: 10mL Gadavist    FINDINGS:    Gallbladder/Biliary Tree: The gallbladder is surgically absent. There  is no intra or extrahepatic biliary dilatation.    Pancreas: Redemonstrated numerous cysts scattered throughout the  pancreas, some of them minimally increased in size when compared to  MRCP from 2021. For example the cyst in the pancreatic neck measures  15 mm compared to 14 mm previously, 2 adjacent cysts in the pancreatic  tail measure 9 mm and 10 mm compared to 8 mm 9 mm previously. The main  pancreatic duct is not dilated. There is no enhancing component within  the cysts.    Liver: No evidence of cirrhosis, iron deposition or steatosis. No  focal hepatic mass. Stable cysts in segments 4 and 6.    Spleen: normal.    Kidneys: No hydronephrosis or focal mass.    Adrenal glands: Normal.    Bowel: Small hiatal hernia Visualized small and large bowel are normal  in  caliber.    Lymph nodes: No lymphadenopathy.    Blood vessels: No aortic aneurysm.    Lung bases: Unremarkable.    Bones and soft tissues: No suspicious or acute osseus lesion.    Mesentery: No ascites.      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 No worrisome features or new  pancreatic mass.    NATHAN GARZA MD         SYSTEM ID:  [id number]       If you have any questions or concerns, please call the clinic at the number listed above.       Sincerely,      Dontae Boyd MD

## 2024-03-27 NOTE — DISCHARGE INSTRUCTIONS
MRI Contrast Discharge Instructions    The IV contrast you received today will pass out of your body in your  urine. This will happen in the next 24 hours. You will not feel this process.  Your urine will not change color.    Drink at least 4 extra glasses of water or juice today (unless your doctor  has restricted your fluids). This reduces the stress on your kidneys.  You may take your regular medicines.    If you are on dialysis: It is best to have dialysis today.    If you have a reaction: Most reactions happen right away. If you have  any new symptoms after leaving the hospital (such as hives or swelling),  call your hospital at the correct number below. Or call your family doctor.  If you have breathing distress or wheezing, call 911.    Special instructions: ***    I have read and understand the above information.    Signature:______________________________________ Date:___________    Staff:__________________________________________ Date:___________     Time:__________    [facility] Departments:    ___[place]: [phone]  ___[facility]: [phone]  ___[place]: [phone] ___[facility]: [phone]  ___[facility]: [phone]  ___[facility]: [phone]  ___[phone]  ___[facility] [place]: [phone]  ___[phone]

## 2024-03-27 NOTE — PATIENT INSTRUCTIONS
You will find a brief summary of your discussion and care plan from today's visit below.  Dr Boyd has outlined the following steps after your recent clinic visit:    RECOMMENDATIONS:  - Can discontinue further pancreatic cyst surveillance (pending final read from radiology)    Please call with any questions or concerns regarding your clinic visit today.     It is a pleasure being involved in your health care.     Contacts post-consultation depending on your need:     Schedule Clinic Appointments                        [phone], option 1    Vanessa Vargas RN Care Coordinator           [phone]     [facility] OR                           [phone]     GI Procedure Scheduling                               [phone], option 2     For urgent/emergent questions after business hours, you may reach the on-call GI Fellow by contacting the [facility]  at [phone].    How to I schedule a follow-up visit?  If you did not schedule a follow-up visit today, please call [phone] option #1 to schedule a follow-up office visit.       How do I schedule labs, imaging studies, or procedures that were ordered in clinic today?      Labs: To schedule lab appointment at the [facility] and Surgery Center, use my chart or call [phone]. If you have a [place] lab closer to home where you are regularly seen you can give them a call.      Procedures: If a colonoscopy, upper endoscopy, breath test, esophageal manometry, or pH impedence was ordered today, our endoscopy team will call you to schedule this. If you have not heard from our endoscopy team within a week, please call [phone] to schedule.      Imaging Studies: If you were scheduled for a CT scan, X-ray, MRI, ultrasound, HIDA scan or other imaging study, please call [phone] to have this scheduled.      Referral: If a referral to another specialty was ordered, expect a phone call or follow instructions above. If you have  not heard from anyone regarding your referral in a week, please call our clinic to check the status.     I recommend signing up for [employer] [place] access if you have not already done so and are comfortable with using a computer.  This allows for online access to your lab results and also helps you communicate efficiently with the clinic should any questions arise in your care.

## 2024-03-27 NOTE — PROGRESS NOTES
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 He ha a repeat surveillance MRI today. I reviewed the images myself. The final read is not yet in from the radiologist. I see no significant change in the size of his pancreatic cysts. Maybe the uncinate process cyst is a bit more prominent. No worrisome features. Given patient's age, I recommend we discontinue further surveillance assuming the final read from the radiologist is reassuring.    I suspect his original episodes of pancreatitis in 2007 were probably gallstone induced although we do not have the records to help corroborate that. His episode in 2018 appears to be idiopathic thus far. No further episodes of pancreatitis since.     RECOMMENDATIONS:  - Can discontinue further pancreatic cyst surveillance (pending final read from radiology)    Thank you for this consultation.  It was a pleasure to participate in the care of this patient; please contact us with any further questions.  A total of 30 minutes was spent in face to face evaluation with this patient, >50% of which was counseling and coordinating a management plan for this patient.     Dontae Boyd MD  [facility]  Division of Gastroenterology and Hepatology  [facility] [address] [address]    ________________________________________________________________  HPI:  Fransico Garcia is a 84 year old male with a history of idiopathic acute pancreatitis and pancreatic cysts who presents for follow up for surveillance of likely side-branch  IPMNs with the largest measuring 1.4 cm previously. He has been doing well since his last clinic visit without any new symptoms or episodes of pancreatitis. He had two episodes of pancreatitis in 2007 that may have been related to gallstones and subsequently underwent cholecystectomy. He then had an episode of mild interstitial pancreatitis in Aug 2018. His work up was negative including and EUS. 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 Denies weight loss, nausea, vomiting, or diarrhea. He had a surveillance MRI performed today.    PMHx:  Past Medical History:   Diagnosis Date    Acute pancreatitis 2007 January and February    Essential hypertension, benign     GERD (gastroesophageal reflux disease)     Gout     Gout, unspecified     Hypertension     Localized osteoarthrosis not specified whether primary or secondary, lower leg 5/24/07    Hospitalized    Other and unspecified malignant neoplasm of skin of other and unspecified parts of face     Other forms of migraine, with intractable migraine, so stated, without mention of status migrainosus     Restless leg syndrome     Restless legs syndrome (RLS)     Squamous cell carcinoma        PSurgHx:  Past Surgical History:   Procedure Laterality Date    ENDOSCOPIC ULTRASOUND UPPER GASTROINTESTINAL TRACT (GI) N/A 11/15/2018    Procedure: Upper Endoscopic Ultrasound;  Surgeon: Dontae Boyd MD;  Location: [facility] OR    HC REMOVAL GALLBLADDER      MOHS MICROGRAPHIC PROCEDURE      Z TOTAL KNEE ARTHROPLASTY  05/21/07    LT       MEDS:  Current Outpatient Medications   Medication    acetaminophen (TYLENOL ARTHRITIS PAIN) 650 MG CR tablet    allopurinol (ZYLOPRIM) 100 MG tablet    ALLOPURINOL 100 MG OR TABS    apixaban ANTICOAGULANT (ELIQUIS) 2.5 MG tablet    aspirin (ASA) 81 MG chewable tablet    atorvastatin (LIPITOR) 80 MG tablet    carvedilol (COREG) 25 MG tablet    cetirizine (ZYRTEC) 10 MG tablet     "DOCUSATE SODIUM PO    FIBERCON 625 MG OR TABS    GLUCOSAMINE CHONDROITIN OR TABS    Multiple Vitamin (MULTI-VITAMINS) TABS    pantoprazole (PROTONIX) 40 MG EC tablet    sotalol (BETAPACE) 120 MG tablet    terazosin (HYTRIN) 10 MG capsule    CARBIDOPA-LEVODOPA  MG OR TABS    fluorouracil (EFUDEX) 5 % external cream    Glucosamine-Chondroit-Vit C-Mn ([facility] GLUCOSAMINE-CHONDROITIN) TABS    IMIQUIMOD 5 % EX CREA    ipratropium (ATROVENT) 0.03 % spray    MULTIVITAMIN OR    Nutritional Supplements (MELATONIN PO)    OMEPRAZOLE 20 MG OR CPDR    simvastatin (ZOCOR) 40 MG tablet    TERAZOSIN HCL 5 MG OR CAPS    TRAMADOL HCL 50 MG OR TABS    VANICREAM SPF 60 EX    warfarin (COUMADIN) 5 MG tablet    ZOLMITRIPTAN 2.5 MG OR TABS     No current facility-administered medications for this visit.     ALLERGIES:    Allergies   Allergen Reactions    Doxycycline Rash     Burning sensation on skin    Niacinamide Muscle Pain (Myalgia) and Nausea     Other reaction(s): Other (see comments), Weakness present, Muscle pain, Nausea  Weakness, muscle pain, nausea  Weakness, muscle pain, nausea       Answers for HPI/ROS submitted by the patient on 10/2/2019   If you checked off any problems, how difficult have these problems made it for you to do your work, take care of things at home, or get along with other people?: Not difficult at all  PHQ9 TOTAL SCORE: 0      Physical Exam  /74 (BP Location: Right arm, Patient Position: Sitting, Cuff Size: Adult Regular)   Pulse 67   Temp 97.8  F (36.6  C) (Tympanic)   Resp 16   Ht 1.815 m (5' 11.46\")   Wt 103.4 kg (228 lb)   SpO2 98%   BMI 31.39 kg/m    Body mass index is 31.39 kg/m .  Gen: A&Ox3, NAD  HEENT: Moist mucus membranes, no scleral icterus.  Lungs: no respiratory distress  Abd: soft, non-tender, non-distended.  No guarding/rigidity/rebound.  Skin: no jaundice, no stigmata of chronic liver disease  Ext: warm, dry, no evidence of edema    LABS:  Hospital Outpatient Visit on " 10/02/2019   Component Date Value Ref Range Status    Creatinine 10/02/2019 1.2  0.66 - 1.25 mg/dL Final    GFR Estimate 10/02/2019 59* >60 mL/min/[1.73_m2] Final    GFR Estimate If Black 10/02/2019 71  >60 mL/min/[1.73_m2] Final     MRCP Without and With Contrast     CLINICAL HISTORY: Idiopathic acute pancreatitis without infection or  necrosis     DATE: 11/24/2021 9:18 AM     TECHNIQUE:      Images were acquired with and without intravenous gadolinium contrast  through the upper abdomen. The following MR images were acquired  without intravenous contrast: TrueFISP, multiplanar T2-weighted, axial  T1 in/out of phase, T2-weighted MRCP images, axial diffusion-weighted  and axial apparent diffusion coefficient. T1-weighted images were  obtained before contrast at the multiple time points following  contrast injection. 3-D reformatted images were generated by the  technologist. Contrast dose: 10ml  Gadavist     Comparison study:     FINDINGS:     Biliary Tree: No intra or extrahepatic biliary ductal dilatation.     Pancreas: Mild atrophy. Intrinsic T1 hyperintense signal is preserved.  Numerous scattered cysts throughout the pancreas. Largest one within  the neck/body on series 33 image 22 is slightly increased, measuring  1.4 cm, previously 1.1 cm. Other cysts are stable. No abnormal  enhancement to suggest malignant transformation. No main pancreatic  duct dilatation, measuring up to 3 mm.     Liver: No intratesticular masses are positioned. Scattered T2  hyperintense cysts, unchanged compared to prior study. No focal  hepatic lesion.     Gallbladder: Surgically absent.     Spleen: Unremarkable. Splenules.     Kidneys: Bilateral tiny T2 hyperintense cysts. No hydronephrosis. No  focal lesion.      Adrenal glands: Unremarkable.     Stomach: Small hiatal hernia.     Bowel: No dilated bowel loop.     Lymph nodes: No lymphadenopathy.     Blood vessels: Unremarkable.     Lung bases: Clear.     Bones and soft tissues: No  suspicious lesion.     Mesentery and abdominal wall: Unremarkable.     Ascites: None                                                                      IMPRESSION: Numerous scattered cysts throughout the pancreas. Largest  one within the neck/body is slightly increased, otherwise stable. No  abnormal enhancement to suggest malignant transformation.

## 2024-03-27 NOTE — NURSING NOTE
"Oncology Rooming Note    March 27, 2024 9:38 AM   Fransico Garcia is a 84 year old male who presents for:    Chief Complaint   Patient presents with    Oncology Clinic Visit     Pancreatic cyst     Initial Vitals: /74 (BP Location: Right arm, Patient Position: Sitting, Cuff Size: Adult Regular)   Pulse 67   Temp 97.8  F (36.6  C) (Tympanic)   Resp 16   Ht 1.815 m (5' 11.46\")   Wt 103.4 kg (228 lb)   SpO2 98%   BMI 31.39 kg/m   Estimated body mass index is 31.39 kg/m  as calculated from the following:    Height as of this encounter: 1.815 m (5' 11.46\").    Weight as of this encounter: 103.4 kg (228 lb). Body surface area is 2.28 meters squared.  Moderate Pain (4) Comment: worsens w movement/walking   No LMP for male patient.  Allergies reviewed: Yes  Medications reviewed: Yes    Medications: Medication refills not needed today.  Pharmacy name entered into EPIC: [facility] - [place], [place] - [address]    Frailty Screening:   Is the patient here for a new oncology consult visit in cancer care? 2. No      Clinical concerns: none       Fabby Reich              "

## (undated) DEVICE — TAPE CLOTH 3" CARDINAL 3TRCL03

## (undated) DEVICE — ENDO CAP AND TUBING STERILE FOR ENDOGATOR  100130

## (undated) DEVICE — ENDO TUBING CO2 SMARTCAP STERILE DISP 100145CO2EXT

## (undated) DEVICE — PACK ENDOSCOPY GI CUSTOM UMMC

## (undated) DEVICE — ENDO BITE BLOCK ADULT OMNI-BLOC

## (undated) DEVICE — SUCTION MANIFOLD DORNOCH ULTRA CART UL-CL500

## (undated) DEVICE — SOL WATER IRRIG 1000ML BOTTLE 2F7114

## (undated) DEVICE — KIT ENDO FIRST STEP DISINFECTANT 200ML W/POUCH EP-4

## (undated) DEVICE — ENDO PROBE COVER ULTRASOUND BALLOON LATEX  MAJ-249

## (undated) RX ORDER — PROPOFOL 10 MG/ML
INJECTION, EMULSION INTRAVENOUS
Status: DISPENSED
Start: 2018-11-15

## (undated) RX ORDER — LIDOCAINE HYDROCHLORIDE 20 MG/ML
INJECTION, SOLUTION EPIDURAL; INFILTRATION; INTRACAUDAL; PERINEURAL
Status: DISPENSED
Start: 2018-11-15

## (undated) RX ORDER — FENTANYL CITRATE 50 UG/ML
INJECTION, SOLUTION INTRAMUSCULAR; INTRAVENOUS
Status: DISPENSED
Start: 2018-11-15